# Patient Record
Sex: FEMALE | Race: WHITE | Employment: OTHER | URBAN - METROPOLITAN AREA
[De-identification: names, ages, dates, MRNs, and addresses within clinical notes are randomized per-mention and may not be internally consistent; named-entity substitution may affect disease eponyms.]

---

## 2017-06-03 ENCOUNTER — HOSPITAL ENCOUNTER (OUTPATIENT)
Dept: MAMMOGRAPHY | Age: 77
Discharge: HOME | End: 2017-06-03
Attending: OBSTETRICS & GYNECOLOGY
Payer: MEDICARE

## 2017-06-03 DIAGNOSIS — Z12.31 VISIT FOR SCREENING MAMMOGRAM: ICD-10-CM

## 2017-06-03 PROCEDURE — 77067 SCR MAMMO BI INCL CAD: CPT

## 2017-10-03 ENCOUNTER — HOSPITAL ENCOUNTER (OUTPATIENT)
Dept: GENERAL RADIOLOGY | Age: 77
Discharge: HOME | End: 2017-10-03
Attending: FAMILY MEDICINE
Payer: MEDICARE

## 2017-10-03 DIAGNOSIS — G89.29 CHRONIC LEFT SHOULDER PAIN: ICD-10-CM

## 2017-10-03 DIAGNOSIS — M25.512 CHRONIC LEFT SHOULDER PAIN: ICD-10-CM

## 2017-10-03 PROCEDURE — 73030 X-RAY EXAM OF SHOULDER: CPT

## 2017-10-03 NOTE — PROGRESS NOTES
Please let patient know the shoulder x-ray showed the bones to be normal and healthy.  Good report, we will recheck the shoulder in 2 weeks

## 2018-03-22 ENCOUNTER — APPOINTMENT (RX ONLY)
Dept: URBAN - METROPOLITAN AREA CLINIC 349 | Facility: CLINIC | Age: 78
Setting detail: DERMATOLOGY
End: 2018-03-22

## 2018-03-22 DIAGNOSIS — Z80.8 FAMILY HISTORY OF MALIGNANT NEOPLASM OF OTHER ORGANS OR SYSTEMS: ICD-10-CM

## 2018-03-22 DIAGNOSIS — D22 MELANOCYTIC NEVI: ICD-10-CM | Status: STABLE

## 2018-03-22 DIAGNOSIS — Z85.828 PERSONAL HISTORY OF OTHER MALIGNANT NEOPLASM OF SKIN: ICD-10-CM

## 2018-03-22 PROCEDURE — ? COUNSELING

## 2018-03-22 ASSESSMENT — LOCATION SIMPLE DESCRIPTION DERM
LOCATION SIMPLE: RIGHT UPPER BACK
LOCATION SIMPLE: LEFT UPPER BACK
LOCATION SIMPLE: RIGHT FOREARM
LOCATION SIMPLE: LEFT EAR

## 2018-03-22 ASSESSMENT — LOCATION DETAILED DESCRIPTION DERM
LOCATION DETAILED: RIGHT PROXIMAL DORSAL FOREARM
LOCATION DETAILED: RIGHT MEDIAL UPPER BACK
LOCATION DETAILED: LEFT ANTITRAGUS
LOCATION DETAILED: LEFT INFERIOR MEDIAL UPPER BACK

## 2018-03-22 ASSESSMENT — LOCATION ZONE DERM
LOCATION ZONE: TRUNK
LOCATION ZONE: EAR
LOCATION ZONE: ARM

## 2018-03-22 NOTE — PROCEDURE: REASSURANCE
Detail Level: Detailed
Quality 194: Oncology: Cancer Stage Documented: Cancer Stage not Documented, Reason not Otherwise Specified

## 2018-06-05 ENCOUNTER — HOSPITAL ENCOUNTER (OUTPATIENT)
Dept: MAMMOGRAPHY | Age: 78
Discharge: HOME | End: 2018-06-05
Attending: FAMILY MEDICINE
Payer: MEDICARE

## 2018-06-05 DIAGNOSIS — Z12.31 VISIT FOR SCREENING MAMMOGRAM: ICD-10-CM

## 2018-06-05 PROCEDURE — 77067 SCR MAMMO BI INCL CAD: CPT

## 2018-06-22 ENCOUNTER — APPOINTMENT (RX ONLY)
Dept: URBAN - METROPOLITAN AREA CLINIC 349 | Facility: CLINIC | Age: 78
Setting detail: DERMATOLOGY
End: 2018-06-22

## 2018-06-22 DIAGNOSIS — L82.1 OTHER SEBORRHEIC KERATOSIS: ICD-10-CM

## 2018-06-22 DIAGNOSIS — L73.8 OTHER SPECIFIED FOLLICULAR DISORDERS: ICD-10-CM

## 2018-06-22 PROCEDURE — ? BENIGN DESTRUCTION COSMETIC

## 2018-06-22 PROCEDURE — ? COUNSELING

## 2018-06-22 PROCEDURE — ? LIQUID NITROGEN (COSMETIC)

## 2018-06-22 PROCEDURE — ? OTHER

## 2018-06-22 ASSESSMENT — LOCATION ZONE DERM: LOCATION ZONE: FACE

## 2018-06-22 ASSESSMENT — LOCATION SIMPLE DESCRIPTION DERM
LOCATION SIMPLE: RIGHT CHEEK
LOCATION SIMPLE: LEFT CHEEK

## 2018-06-22 ASSESSMENT — LOCATION DETAILED DESCRIPTION DERM
LOCATION DETAILED: LEFT INFERIOR CENTRAL MALAR CHEEK
LOCATION DETAILED: RIGHT SUPERIOR NASAL CHEEK

## 2018-06-22 NOTE — PROCEDURE: BENIGN DESTRUCTION COSMETIC
Anesthesia Volume In Cc: 0
Detail Level: Detailed
Post-Care Instructions: I reviewed with the patient in detail post-care instructions. Patient is to wear sunprotection, and avoid picking at any of the treated lesions. Pt may apply Vaseline to crusted or scabbing areas.
Price (Use Numbers Only, No Special Characters Or $): 25.00
Consent: The patient's consent was obtained including but not limited to risks of crusting, scabbing, blistering, scarring, darker or lighter pigmentary change, recurrence, incomplete removal and infection.

## 2018-06-22 NOTE — PROCEDURE: OTHER
Detail Level: Detailed
Other (Free Text): Before cosmetic cryo treatment photo taken today
Note Text (......Xxx Chief Complaint.): This diagnosis correlates with the

## 2018-06-22 NOTE — PROCEDURE: LIQUID NITROGEN (COSMETIC)
Price (Use Numbers Only, No Special Characters Or $): 25.00
Render Post-Care Instructions In Note?: no
Detail Level: Detailed
Consent: The patient's consent was obtained including but not limited to risks of crusting, scabbing, blistering, scarring, darker or lighter pigmentary change, recurrence, incomplete removal and infection. The patient understands that the procedure is cosmetic in nature and is not covered by insurance.
Post-Care Instructions: I reviewed with the patient in detail post-care instructions. Patient is to wear sunprotection, and avoid picking at any of the treated lesions. Pt may apply Vaseline to crusted or scabbing areas.

## 2018-10-03 ENCOUNTER — HOSPITAL ENCOUNTER (OUTPATIENT)
Dept: GENERAL RADIOLOGY | Age: 78
Discharge: HOME | End: 2018-10-03
Attending: FAMILY MEDICINE
Payer: MEDICARE

## 2018-10-03 PROCEDURE — 72040 X-RAY EXAM NECK SPINE 2-3 VW: CPT

## 2018-10-03 PROCEDURE — 72072 X-RAY EXAM THORAC SPINE 3VWS: CPT

## 2018-11-18 PROBLEM — Z98.890 S/P COLONOSCOPY: Chronic | Status: ACTIVE | Noted: 2018-11-18

## 2018-12-17 ENCOUNTER — HOSPITAL ENCOUNTER (OUTPATIENT)
Dept: MAMMOGRAPHY | Age: 78
Discharge: HOME | End: 2018-12-17
Attending: FAMILY MEDICINE
Payer: MEDICARE

## 2018-12-17 DIAGNOSIS — Z78.0 POSTMENOPAUSAL: ICD-10-CM

## 2018-12-17 PROCEDURE — 77080 DXA BONE DENSITY AXIAL: CPT

## 2018-12-17 NOTE — PROGRESS NOTES
Please let patient know reviewed her bone density scan. She still has higher risk for hip fracture. However there is been no improvement in the bone density in her spine. We will discuss further on follow-up in February no changes in current therapy.

## 2019-08-07 ENCOUNTER — HOSPITAL ENCOUNTER (OUTPATIENT)
Dept: MAMMOGRAPHY | Age: 79
Discharge: HOME | End: 2019-08-07
Attending: FAMILY MEDICINE
Payer: MEDICARE

## 2019-08-07 DIAGNOSIS — Z12.39 BREAST SCREENING, UNSPECIFIED: ICD-10-CM

## 2019-08-07 PROCEDURE — 77067 SCR MAMMO BI INCL CAD: CPT

## 2019-10-18 PROBLEM — Z96.0 PRESENCE OF PESSARY: Status: ACTIVE | Noted: 2019-03-19

## 2019-10-18 PROBLEM — N81.4 UTERINE PROLAPSE: Status: ACTIVE | Noted: 2019-03-19

## 2019-10-21 ENCOUNTER — HOSPITAL ENCOUNTER (OUTPATIENT)
Dept: GENERAL RADIOLOGY | Age: 79
Discharge: HOME | End: 2019-10-21
Attending: INTERNAL MEDICINE
Payer: MEDICARE

## 2019-10-21 DIAGNOSIS — M54.42 LEFT-SIDED LOW BACK PAIN WITH LEFT-SIDED SCIATICA, UNSPECIFIED CHRONICITY: ICD-10-CM

## 2019-10-21 PROCEDURE — 72110 X-RAY EXAM L-2 SPINE 4/>VWS: CPT

## 2019-10-21 NOTE — PROGRESS NOTES
Lumbar xray negative for compression fracture; does have multilevel degenerative changes, disc space narrowing; is she ok with me ordering MRI? Does she have any metal in her body?

## 2019-10-21 NOTE — PROGRESS NOTES
Talked to pt and informed her, per Dr. Jose Angel, Lumbar xray negative for compression fracture; does have multilevel degenerative changes, disc space narrowing; is she ok with me ordering MRI? Does she have any metal in her body? Pt is ok w/ you ordering an MRI and she has no metal in her body.

## 2019-11-01 ENCOUNTER — HOSPITAL ENCOUNTER (OUTPATIENT)
Dept: MRI IMAGING | Age: 79
Discharge: HOME | End: 2019-11-01
Attending: INTERNAL MEDICINE
Payer: MEDICARE

## 2019-11-01 DIAGNOSIS — M54.42 LEFT-SIDED LOW BACK PAIN WITH LEFT-SIDED SCIATICA, UNSPECIFIED CHRONICITY: ICD-10-CM

## 2019-11-01 PROCEDURE — 72148 MRI LUMBAR SPINE W/O DYE: CPT

## 2019-11-05 NOTE — PROGRESS NOTES
Talked to pt and informed her, per Dr. Tipton Situ, MRI revealed multilevel degenerative disc disease, foraminal encroachment, would benefit from eval w/ Neurosurgery; is she ok with referral?  Pt stated she would like to \"pray about being referred to a neurosurgeon\". She will let us know once she's decided.

## 2019-11-21 PROBLEM — M48.07 FORAMINAL STENOSIS OF LUMBOSACRAL REGION: Status: ACTIVE | Noted: 2019-11-21

## 2020-10-26 ENCOUNTER — TRANSCRIBE ORDER (OUTPATIENT)
Dept: SCHEDULING | Age: 80
End: 2020-10-26

## 2020-10-26 DIAGNOSIS — Z12.31 ENCOUNTER FOR SCREENING MAMMOGRAM FOR MALIGNANT NEOPLASM OF BREAST: Primary | ICD-10-CM

## 2020-10-29 ENCOUNTER — HOSPITAL ENCOUNTER (OUTPATIENT)
Dept: MAMMOGRAPHY | Age: 80
Discharge: HOME | End: 2020-10-29
Attending: OBSTETRICS & GYNECOLOGY
Payer: MEDICARE

## 2020-10-29 DIAGNOSIS — Z12.31 ENCOUNTER FOR SCREENING MAMMOGRAM FOR MALIGNANT NEOPLASM OF BREAST: ICD-10-CM

## 2020-10-29 PROCEDURE — 77067 SCR MAMMO BI INCL CAD: CPT

## 2021-03-24 PROBLEM — M85.80 OSTEOPENIA: Status: ACTIVE | Noted: 2021-03-24

## 2021-03-24 PROBLEM — M51.36 DEGENERATIVE DISC DISEASE, LUMBAR: Status: ACTIVE | Noted: 2021-03-24

## 2021-03-24 PROBLEM — N18.31 STAGE 3A CHRONIC KIDNEY DISEASE (HCC): Status: ACTIVE | Noted: 2021-03-24

## 2021-03-24 PROBLEM — K21.9 GASTROESOPHAGEAL REFLUX DISEASE: Status: ACTIVE | Noted: 2021-03-24

## 2021-03-24 PROBLEM — E78.2 MIXED HYPERLIPIDEMIA: Status: ACTIVE | Noted: 2021-03-24

## 2021-04-20 PROBLEM — E55.9 VITAMIN D DEFICIENCY: Status: ACTIVE | Noted: 2021-04-20

## 2021-04-27 ENCOUNTER — HOME HEALTH ADMISSION (OUTPATIENT)
Dept: HOME HEALTH SERVICES | Facility: HOME HEALTH | Age: 81
End: 2021-04-27
Payer: MEDICARE

## 2021-04-27 PROBLEM — J30.9 ALLERGIC RHINITIS: Status: ACTIVE | Noted: 2021-04-27

## 2021-04-28 ENCOUNTER — HOME CARE VISIT (OUTPATIENT)
Dept: SCHEDULING | Facility: HOME HEALTH | Age: 81
End: 2021-04-28
Payer: MEDICARE

## 2021-04-28 VITALS
SYSTOLIC BLOOD PRESSURE: 130 MMHG | OXYGEN SATURATION: 95 % | HEART RATE: 52 BPM | DIASTOLIC BLOOD PRESSURE: 74 MMHG | TEMPERATURE: 98.6 F

## 2021-04-28 PROCEDURE — G0151 HHCP-SERV OF PT,EA 15 MIN: HCPCS

## 2021-04-28 PROCEDURE — 3331090002 HH PPS REVENUE DEBIT

## 2021-04-28 PROCEDURE — 400013 HH SOC

## 2021-04-28 PROCEDURE — 400018 HH-NO PAY CLAIM PROCEDURE

## 2021-04-28 PROCEDURE — 3331090001 HH PPS REVENUE CREDIT

## 2021-04-29 PROCEDURE — 3331090002 HH PPS REVENUE DEBIT

## 2021-04-29 PROCEDURE — 3331090001 HH PPS REVENUE CREDIT

## 2021-04-30 ENCOUNTER — HOME CARE VISIT (OUTPATIENT)
Dept: SCHEDULING | Facility: HOME HEALTH | Age: 81
End: 2021-04-30
Payer: MEDICARE

## 2021-04-30 VITALS
SYSTOLIC BLOOD PRESSURE: 130 MMHG | DIASTOLIC BLOOD PRESSURE: 70 MMHG | RESPIRATION RATE: 18 BRPM | TEMPERATURE: 98.2 F | HEART RATE: 60 BPM

## 2021-04-30 PROCEDURE — 3331090002 HH PPS REVENUE DEBIT

## 2021-04-30 PROCEDURE — G0157 HHC PT ASSISTANT EA 15: HCPCS

## 2021-04-30 PROCEDURE — 3331090001 HH PPS REVENUE CREDIT

## 2021-05-01 PROCEDURE — 3331090001 HH PPS REVENUE CREDIT

## 2021-05-01 PROCEDURE — 3331090002 HH PPS REVENUE DEBIT

## 2021-05-02 PROCEDURE — 3331090002 HH PPS REVENUE DEBIT

## 2021-05-02 PROCEDURE — 3331090001 HH PPS REVENUE CREDIT

## 2021-05-03 ENCOUNTER — HOME CARE VISIT (OUTPATIENT)
Dept: SCHEDULING | Facility: HOME HEALTH | Age: 81
End: 2021-05-03
Payer: MEDICARE

## 2021-05-03 VITALS
SYSTOLIC BLOOD PRESSURE: 126 MMHG | RESPIRATION RATE: 18 BRPM | HEART RATE: 54 BPM | TEMPERATURE: 95.4 F | DIASTOLIC BLOOD PRESSURE: 86 MMHG

## 2021-05-03 PROCEDURE — G0151 HHCP-SERV OF PT,EA 15 MIN: HCPCS

## 2021-05-03 PROCEDURE — 3331090002 HH PPS REVENUE DEBIT

## 2021-05-03 PROCEDURE — 3331090001 HH PPS REVENUE CREDIT

## 2021-05-04 PROCEDURE — 3331090002 HH PPS REVENUE DEBIT

## 2021-05-04 PROCEDURE — 3331090001 HH PPS REVENUE CREDIT

## 2021-05-05 PROCEDURE — 3331090002 HH PPS REVENUE DEBIT

## 2021-05-05 PROCEDURE — 3331090001 HH PPS REVENUE CREDIT

## 2021-05-06 ENCOUNTER — HOME CARE VISIT (OUTPATIENT)
Dept: SCHEDULING | Facility: HOME HEALTH | Age: 81
End: 2021-05-06
Payer: MEDICARE

## 2021-05-06 VITALS
OXYGEN SATURATION: 97 % | HEART RATE: 58 BPM | DIASTOLIC BLOOD PRESSURE: 74 MMHG | SYSTOLIC BLOOD PRESSURE: 138 MMHG | TEMPERATURE: 97.3 F

## 2021-05-06 PROCEDURE — G0157 HHC PT ASSISTANT EA 15: HCPCS

## 2021-05-06 PROCEDURE — 3331090002 HH PPS REVENUE DEBIT

## 2021-05-06 PROCEDURE — 3331090001 HH PPS REVENUE CREDIT

## 2021-05-07 PROCEDURE — 3331090002 HH PPS REVENUE DEBIT

## 2021-05-07 PROCEDURE — 3331090001 HH PPS REVENUE CREDIT

## 2021-05-08 PROCEDURE — 3331090001 HH PPS REVENUE CREDIT

## 2021-05-08 PROCEDURE — 3331090002 HH PPS REVENUE DEBIT

## 2021-05-09 PROCEDURE — 3331090002 HH PPS REVENUE DEBIT

## 2021-05-09 PROCEDURE — 3331090001 HH PPS REVENUE CREDIT

## 2021-05-10 ENCOUNTER — HOME CARE VISIT (OUTPATIENT)
Dept: SCHEDULING | Facility: HOME HEALTH | Age: 81
End: 2021-05-10
Payer: MEDICARE

## 2021-05-10 VITALS
TEMPERATURE: 97.4 F | RESPIRATION RATE: 14 BRPM | SYSTOLIC BLOOD PRESSURE: 122 MMHG | DIASTOLIC BLOOD PRESSURE: 76 MMHG | HEART RATE: 58 BPM | OXYGEN SATURATION: 95 %

## 2021-05-10 PROCEDURE — 3331090002 HH PPS REVENUE DEBIT

## 2021-05-10 PROCEDURE — 3331090001 HH PPS REVENUE CREDIT

## 2021-05-10 PROCEDURE — G0157 HHC PT ASSISTANT EA 15: HCPCS

## 2021-05-11 PROCEDURE — 3331090001 HH PPS REVENUE CREDIT

## 2021-05-11 PROCEDURE — 3331090002 HH PPS REVENUE DEBIT

## 2021-05-12 PROCEDURE — 3331090001 HH PPS REVENUE CREDIT

## 2021-05-12 PROCEDURE — 3331090002 HH PPS REVENUE DEBIT

## 2021-05-13 ENCOUNTER — HOME CARE VISIT (OUTPATIENT)
Dept: SCHEDULING | Facility: HOME HEALTH | Age: 81
End: 2021-05-13
Payer: MEDICARE

## 2021-05-13 VITALS
DIASTOLIC BLOOD PRESSURE: 82 MMHG | SYSTOLIC BLOOD PRESSURE: 142 MMHG | TEMPERATURE: 97.3 F | OXYGEN SATURATION: 98 % | HEART RATE: 61 BPM | RESPIRATION RATE: 16 BRPM

## 2021-05-13 PROCEDURE — G0157 HHC PT ASSISTANT EA 15: HCPCS

## 2021-05-13 PROCEDURE — 3331090001 HH PPS REVENUE CREDIT

## 2021-05-13 PROCEDURE — 3331090002 HH PPS REVENUE DEBIT

## 2021-05-14 PROCEDURE — 3331090002 HH PPS REVENUE DEBIT

## 2021-05-14 PROCEDURE — 3331090001 HH PPS REVENUE CREDIT

## 2021-05-15 PROCEDURE — 3331090001 HH PPS REVENUE CREDIT

## 2021-05-15 PROCEDURE — 3331090002 HH PPS REVENUE DEBIT

## 2021-05-16 PROCEDURE — 3331090002 HH PPS REVENUE DEBIT

## 2021-05-16 PROCEDURE — 3331090001 HH PPS REVENUE CREDIT

## 2021-05-17 ENCOUNTER — HOME CARE VISIT (OUTPATIENT)
Dept: SCHEDULING | Facility: HOME HEALTH | Age: 81
End: 2021-05-17
Payer: MEDICARE

## 2021-05-17 VITALS
DIASTOLIC BLOOD PRESSURE: 78 MMHG | TEMPERATURE: 97.7 F | HEART RATE: 60 BPM | RESPIRATION RATE: 16 BRPM | SYSTOLIC BLOOD PRESSURE: 126 MMHG

## 2021-05-17 PROCEDURE — 3331090002 HH PPS REVENUE DEBIT

## 2021-05-17 PROCEDURE — G0151 HHCP-SERV OF PT,EA 15 MIN: HCPCS

## 2021-05-17 PROCEDURE — 3331090001 HH PPS REVENUE CREDIT

## 2021-12-05 PROBLEM — E78.2 MIXED HYPERLIPIDEMIA: Status: RESOLVED | Noted: 2021-03-24 | Resolved: 2021-12-05

## 2021-12-22 ENCOUNTER — APPOINTMENT (RX ONLY)
Dept: URBAN - METROPOLITAN AREA CLINIC 349 | Facility: CLINIC | Age: 81
Setting detail: DERMATOLOGY
End: 2021-12-22

## 2021-12-22 ENCOUNTER — APPOINTMENT (OUTPATIENT)
Dept: PHYSICAL THERAPY | Age: 81
End: 2021-12-22
Attending: STUDENT IN AN ORGANIZED HEALTH CARE EDUCATION/TRAINING PROGRAM

## 2021-12-22 DIAGNOSIS — L82.1 OTHER SEBORRHEIC KERATOSIS: ICD-10-CM

## 2021-12-22 DIAGNOSIS — Z85.828 PERSONAL HISTORY OF OTHER MALIGNANT NEOPLASM OF SKIN: ICD-10-CM

## 2021-12-22 DIAGNOSIS — Z80.8 FAMILY HISTORY OF MALIGNANT NEOPLASM OF OTHER ORGANS OR SYSTEMS: ICD-10-CM

## 2021-12-22 DIAGNOSIS — L82.0 INFLAMED SEBORRHEIC KERATOSIS: ICD-10-CM

## 2021-12-22 DIAGNOSIS — L21.8 OTHER SEBORRHEIC DERMATITIS: ICD-10-CM | Status: WELL CONTROLLED

## 2021-12-22 DIAGNOSIS — L85.3 XEROSIS CUTIS: ICD-10-CM

## 2021-12-22 PROBLEM — I10 ESSENTIAL (PRIMARY) HYPERTENSION: Status: ACTIVE | Noted: 2021-12-22

## 2021-12-22 PROBLEM — E03.9 HYPOTHYROIDISM, UNSPECIFIED: Status: ACTIVE | Noted: 2021-12-22

## 2021-12-22 PROCEDURE — ? TREATMENT REGIMEN

## 2021-12-22 PROCEDURE — 99213 OFFICE O/P EST LOW 20 MIN: CPT | Mod: 25

## 2021-12-22 PROCEDURE — 17110 DESTRUCTION B9 LES UP TO 14: CPT

## 2021-12-22 PROCEDURE — ? LIQUID NITROGEN

## 2021-12-22 PROCEDURE — ? PRESCRIPTION MEDICATION MANAGEMENT

## 2021-12-22 PROCEDURE — ? COUNSELING

## 2021-12-22 ASSESSMENT — LOCATION DETAILED DESCRIPTION DERM
LOCATION DETAILED: LEFT ANTITRAGUS
LOCATION DETAILED: RIGHT PROXIMAL DORSAL FOREARM
LOCATION DETAILED: LEFT PROXIMAL RADIAL DORSAL FOREARM
LOCATION DETAILED: RIGHT PROXIMAL RADIAL DORSAL FOREARM
LOCATION DETAILED: RIGHT SUPERIOR LATERAL MALAR CHEEK
LOCATION DETAILED: RIGHT INFERIOR CENTRAL MALAR CHEEK
LOCATION DETAILED: LEFT DISTAL DORSAL FOREARM
LOCATION DETAILED: RIGHT DISTAL DORSAL FOREARM
LOCATION DETAILED: LEFT INFERIOR MEDIAL UPPER BACK
LOCATION DETAILED: MID-FRONTAL SCALP

## 2021-12-22 ASSESSMENT — LOCATION SIMPLE DESCRIPTION DERM
LOCATION SIMPLE: RIGHT CHEEK
LOCATION SIMPLE: LEFT UPPER BACK
LOCATION SIMPLE: LEFT FOREARM
LOCATION SIMPLE: RIGHT FOREARM
LOCATION SIMPLE: LEFT EAR
LOCATION SIMPLE: ANTERIOR SCALP

## 2021-12-22 ASSESSMENT — LOCATION ZONE DERM
LOCATION ZONE: ARM
LOCATION ZONE: EAR
LOCATION ZONE: SCALP
LOCATION ZONE: FACE
LOCATION ZONE: TRUNK

## 2021-12-22 ASSESSMENT — SEVERITY ASSESSMENT: HOW SEVERE IS THIS PATIENT'S CONDITION?: CLEAR

## 2021-12-22 NOTE — PROCEDURE: TREATMENT REGIMEN
Samples Given: Cerave cream, Cerave hydrating wash, eucerin advanced repair cream, apply to arms as needed.
Detail Level: Detailed

## 2021-12-22 NOTE — PROCEDURE: PRESCRIPTION MEDICATION MANAGEMENT
Plan: Use clobetasol 0.05 % scalp solution twice daily x 2 weeks as needed for flares. \\nPatient declines needing refills at this time.
Detail Level: Zone
Render In Strict Bullet Format?: No

## 2021-12-22 NOTE — PROCEDURE: LIQUID NITROGEN
Medical Necessity Clause: This procedure was medically necessary because the lesions that were treated were:
Duration Of Freeze Thaw-Cycle (Seconds): 5-10
Add 52 Modifier (Optional): no
Post-Care Instructions: I reviewed with the patient in detail post-care instructions. Patient is to wear sunprotection, and avoid picking at any of the treated lesions. Pt may apply Vaseline to crusted or scabbing areas.
Detail Level: Detailed
Show Topical Anesthesia Variable?: Yes
Number Of Freeze-Thaw Cycles: 2 freeze-thaw cycles
Medical Necessity Information: It is in your best interest to select a reason for this procedure from the list below. All of these items fulfill various CMS LCD requirements except the new and changing color options.
Consent: The patient's consent was obtained including but not limited to risks of crusting, scabbing, blistering, scarring, darker or lighter pigmentary change, recurrence, incomplete removal and infection.

## 2021-12-29 ENCOUNTER — HOME HEALTH ADMISSION (OUTPATIENT)
Dept: HOME HEALTH SERVICES | Facility: HOME HEALTH | Age: 81
End: 2021-12-29
Payer: MEDICARE

## 2022-01-06 ENCOUNTER — HOME CARE VISIT (OUTPATIENT)
Dept: HOME HEALTH SERVICES | Facility: HOME HEALTH | Age: 82
End: 2022-01-06

## 2022-01-11 ENCOUNTER — HOME CARE VISIT (OUTPATIENT)
Dept: SCHEDULING | Facility: HOME HEALTH | Age: 82
End: 2022-01-11
Payer: MEDICARE

## 2022-01-11 VITALS
TEMPERATURE: 98.1 F | HEART RATE: 80 BPM | OXYGEN SATURATION: 96 % | RESPIRATION RATE: 17 BRPM | SYSTOLIC BLOOD PRESSURE: 128 MMHG | DIASTOLIC BLOOD PRESSURE: 78 MMHG

## 2022-01-11 PROCEDURE — 400013 HH SOC

## 2022-01-11 PROCEDURE — G0151 HHCP-SERV OF PT,EA 15 MIN: HCPCS

## 2022-01-11 PROCEDURE — 400018 HH-NO PAY CLAIM PROCEDURE

## 2022-01-13 ENCOUNTER — HOME CARE VISIT (OUTPATIENT)
Dept: HOME HEALTH SERVICES | Facility: HOME HEALTH | Age: 82
End: 2022-01-13
Payer: MEDICARE

## 2022-01-17 ENCOUNTER — HOME CARE VISIT (OUTPATIENT)
Dept: HOME HEALTH SERVICES | Facility: HOME HEALTH | Age: 82
End: 2022-01-17
Payer: MEDICARE

## 2022-01-17 ENCOUNTER — HOME CARE VISIT (OUTPATIENT)
Dept: SCHEDULING | Facility: HOME HEALTH | Age: 82
End: 2022-01-17
Payer: MEDICARE

## 2022-01-17 VITALS
RESPIRATION RATE: 16 BRPM | TEMPERATURE: 98 F | OXYGEN SATURATION: 96 % | DIASTOLIC BLOOD PRESSURE: 84 MMHG | HEART RATE: 80 BPM | SYSTOLIC BLOOD PRESSURE: 136 MMHG

## 2022-01-17 PROCEDURE — G0151 HHCP-SERV OF PT,EA 15 MIN: HCPCS

## 2022-01-20 ENCOUNTER — HOME CARE VISIT (OUTPATIENT)
Dept: SCHEDULING | Facility: HOME HEALTH | Age: 82
End: 2022-01-20
Payer: MEDICARE

## 2022-01-20 VITALS
DIASTOLIC BLOOD PRESSURE: 76 MMHG | HEART RATE: 72 BPM | SYSTOLIC BLOOD PRESSURE: 134 MMHG | OXYGEN SATURATION: 95 % | TEMPERATURE: 97.8 F | RESPIRATION RATE: 16 BRPM

## 2022-01-20 PROCEDURE — G0157 HHC PT ASSISTANT EA 15: HCPCS

## 2022-01-25 ENCOUNTER — HOME CARE VISIT (OUTPATIENT)
Dept: SCHEDULING | Facility: HOME HEALTH | Age: 82
End: 2022-01-25
Payer: MEDICARE

## 2022-01-25 VITALS
TEMPERATURE: 97.9 F | RESPIRATION RATE: 16 BRPM | HEART RATE: 76 BPM | SYSTOLIC BLOOD PRESSURE: 110 MMHG | DIASTOLIC BLOOD PRESSURE: 66 MMHG | OXYGEN SATURATION: 93 %

## 2022-01-25 PROCEDURE — G0157 HHC PT ASSISTANT EA 15: HCPCS

## 2022-01-27 ENCOUNTER — HOME CARE VISIT (OUTPATIENT)
Dept: SCHEDULING | Facility: HOME HEALTH | Age: 82
End: 2022-01-27
Payer: MEDICARE

## 2022-01-27 VITALS
TEMPERATURE: 97.7 F | OXYGEN SATURATION: 97 % | HEART RATE: 76 BPM | RESPIRATION RATE: 18 BRPM | DIASTOLIC BLOOD PRESSURE: 72 MMHG | SYSTOLIC BLOOD PRESSURE: 112 MMHG

## 2022-01-27 PROCEDURE — G0151 HHCP-SERV OF PT,EA 15 MIN: HCPCS

## 2022-03-18 PROBLEM — M85.80 OSTEOPENIA: Status: ACTIVE | Noted: 2021-03-24

## 2022-03-18 PROBLEM — M51.36 DEGENERATIVE DISC DISEASE, LUMBAR: Status: ACTIVE | Noted: 2021-03-24

## 2022-03-19 PROBLEM — K21.9 GASTROESOPHAGEAL REFLUX DISEASE: Status: ACTIVE | Noted: 2021-03-24

## 2022-03-19 PROBLEM — Z96.0 PRESENCE OF PESSARY: Status: ACTIVE | Noted: 2019-03-19

## 2022-03-19 PROBLEM — M48.07 FORAMINAL STENOSIS OF LUMBOSACRAL REGION: Status: ACTIVE | Noted: 2019-11-21

## 2022-03-19 PROBLEM — E55.9 VITAMIN D DEFICIENCY: Status: ACTIVE | Noted: 2021-04-20

## 2022-03-20 PROBLEM — Z98.890 S/P COLONOSCOPY: Status: ACTIVE | Noted: 2018-11-18

## 2022-03-20 PROBLEM — N81.4 UTERINE PROLAPSE: Status: ACTIVE | Noted: 2019-03-19

## 2022-03-20 PROBLEM — J30.9 ALLERGIC RHINITIS: Status: ACTIVE | Noted: 2021-04-27

## 2022-05-31 ENCOUNTER — TELEPHONE (OUTPATIENT)
Dept: INTERNAL MEDICINE CLINIC | Facility: CLINIC | Age: 82
End: 2022-05-31

## 2022-05-31 DIAGNOSIS — F32.A ANXIETY AND DEPRESSION: Primary | ICD-10-CM

## 2022-05-31 DIAGNOSIS — F41.9 ANXIETY AND DEPRESSION: Primary | ICD-10-CM

## 2022-05-31 RX ORDER — DIAZEPAM 5 MG/1
TABLET ORAL
Qty: 60 TABLET | Refills: 2 | Status: SHIPPED | OUTPATIENT
Start: 2022-05-31 | End: 2022-09-02 | Stop reason: SDUPTHER

## 2022-05-31 NOTE — TELEPHONE ENCOUNTER
Patient requesting refill on Valium. PDMP reviewed showing diazepam 5 mg tablets, quantity #60, 30-day supply last filled on 4/28/2022. New prescription with refills sent to pharmacy on record.

## 2022-05-31 NOTE — TELEPHONE ENCOUNTER
Pt called and stated that she is completely out of Valium and has been all weekend. She is requesting refill today if possible.

## 2022-07-14 DIAGNOSIS — E03.9 ACQUIRED HYPOTHYROIDISM: Primary | ICD-10-CM

## 2022-07-14 DIAGNOSIS — E03.9 ACQUIRED HYPOTHYROIDISM: ICD-10-CM

## 2022-07-14 RX ORDER — LEVOTHYROXINE SODIUM 0.1 MG/1
TABLET ORAL
Qty: 90 TABLET | OUTPATIENT
Start: 2022-07-14

## 2022-07-14 RX ORDER — LEVOTHYROXINE SODIUM 0.1 MG/1
100 TABLET ORAL
Qty: 30 TABLET | Refills: 0 | Status: SHIPPED | OUTPATIENT
Start: 2022-07-14 | End: 2022-08-22 | Stop reason: SDUPTHER

## 2022-07-27 ENCOUNTER — NURSE ONLY (OUTPATIENT)
Dept: INTERNAL MEDICINE CLINIC | Facility: CLINIC | Age: 82
End: 2022-07-27

## 2022-07-27 ENCOUNTER — OFFICE VISIT (OUTPATIENT)
Dept: INTERNAL MEDICINE CLINIC | Facility: CLINIC | Age: 82
End: 2022-07-27
Payer: MEDICARE

## 2022-07-27 VITALS
TEMPERATURE: 97.4 F | SYSTOLIC BLOOD PRESSURE: 122 MMHG | BODY MASS INDEX: 26.16 KG/M2 | RESPIRATION RATE: 16 BRPM | HEIGHT: 66 IN | DIASTOLIC BLOOD PRESSURE: 79 MMHG | WEIGHT: 162.8 LBS | HEART RATE: 76 BPM | OXYGEN SATURATION: 95 %

## 2022-07-27 DIAGNOSIS — E03.9 ACQUIRED HYPOTHYROIDISM: ICD-10-CM

## 2022-07-27 DIAGNOSIS — R42 DIZZINESS: ICD-10-CM

## 2022-07-27 DIAGNOSIS — R79.9 ABNORMAL BLOOD CHEMISTRY: ICD-10-CM

## 2022-07-27 DIAGNOSIS — E55.9 VITAMIN D INSUFFICIENCY: ICD-10-CM

## 2022-07-27 DIAGNOSIS — K21.9 GASTROESOPHAGEAL REFLUX DISEASE WITHOUT ESOPHAGITIS: ICD-10-CM

## 2022-07-27 DIAGNOSIS — J30.1 NON-SEASONAL ALLERGIC RHINITIS DUE TO POLLEN: ICD-10-CM

## 2022-07-27 DIAGNOSIS — E55.9 VITAMIN D DEFICIENCY: ICD-10-CM

## 2022-07-27 DIAGNOSIS — Z12.31 VISIT FOR SCREENING MAMMOGRAM: ICD-10-CM

## 2022-07-27 DIAGNOSIS — R53.82 CHRONIC FATIGUE: ICD-10-CM

## 2022-07-27 DIAGNOSIS — M85.89 OSTEOPENIA OF MULTIPLE SITES: ICD-10-CM

## 2022-07-27 DIAGNOSIS — R63.4 WEIGHT LOSS: Primary | ICD-10-CM

## 2022-07-27 DIAGNOSIS — F41.9 ANXIETY AND DEPRESSION: ICD-10-CM

## 2022-07-27 DIAGNOSIS — F32.A ANXIETY AND DEPRESSION: ICD-10-CM

## 2022-07-27 DIAGNOSIS — R63.4 WEIGHT LOSS: ICD-10-CM

## 2022-07-27 PROCEDURE — 1123F ACP DISCUSS/DSCN MKR DOCD: CPT | Performed by: INTERNAL MEDICINE

## 2022-07-27 PROCEDURE — G8417 CALC BMI ABV UP PARAM F/U: HCPCS | Performed by: INTERNAL MEDICINE

## 2022-07-27 PROCEDURE — 1036F TOBACCO NON-USER: CPT | Performed by: INTERNAL MEDICINE

## 2022-07-27 PROCEDURE — G8399 PT W/DXA RESULTS DOCUMENT: HCPCS | Performed by: INTERNAL MEDICINE

## 2022-07-27 PROCEDURE — G8427 DOCREV CUR MEDS BY ELIG CLIN: HCPCS | Performed by: INTERNAL MEDICINE

## 2022-07-27 PROCEDURE — 1090F PRES/ABSN URINE INCON ASSESS: CPT | Performed by: INTERNAL MEDICINE

## 2022-07-27 PROCEDURE — 99214 OFFICE O/P EST MOD 30 MIN: CPT | Performed by: INTERNAL MEDICINE

## 2022-07-27 RX ORDER — OMEPRAZOLE 20 MG/1
20 CAPSULE, DELAYED RELEASE ORAL
Qty: 30 CAPSULE | Refills: 5 | Status: SHIPPED | OUTPATIENT
Start: 2022-07-27 | End: 2022-10-06

## 2022-07-27 RX ORDER — METOPROLOL SUCCINATE 25 MG/1
25 TABLET, EXTENDED RELEASE ORAL DAILY
COMMUNITY

## 2022-07-27 RX ORDER — MIRTAZAPINE 7.5 MG/1
7.5 TABLET, FILM COATED ORAL NIGHTLY
Qty: 30 TABLET | Refills: 5 | Status: SHIPPED | OUTPATIENT
Start: 2022-07-27 | End: 2022-10-06

## 2022-07-27 ASSESSMENT — PATIENT HEALTH QUESTIONNAIRE - PHQ9
SUM OF ALL RESPONSES TO PHQ QUESTIONS 1-9: 2
SUM OF ALL RESPONSES TO PHQ QUESTIONS 1-9: 2
SUM OF ALL RESPONSES TO PHQ9 QUESTIONS 1 & 2: 2
SUM OF ALL RESPONSES TO PHQ QUESTIONS 1-9: 2
1. LITTLE INTEREST OR PLEASURE IN DOING THINGS: 1
SUM OF ALL RESPONSES TO PHQ QUESTIONS 1-9: 2
2. FEELING DOWN, DEPRESSED OR HOPELESS: 1

## 2022-07-27 ASSESSMENT — ENCOUNTER SYMPTOMS
SINUS PRESSURE: 0
BACK PAIN: 1
COUGH: 0
CHEST TIGHTNESS: 0
RHINORRHEA: 1
NAUSEA: 0
SHORTNESS OF BREATH: 0
BLOOD IN STOOL: 0
DIARRHEA: 0
VOMITING: 0
ABDOMINAL PAIN: 0

## 2022-07-27 NOTE — PROGRESS NOTES
[facility]      [facility]    Patient Name: Majel Hodgkins  :  1940    Subjective:    Chief Complaint:  Chief Complaint   Patient presents with    Other     No appetite,tired,feels like she has a cold all the time,dizzy         HPI \"I'm falling apart\"; c/o feeling dizzy, has decreased appetite, fatigue x1 month; she has chronic back pain, she is scheduled to have KARI next week; she is having difficulty walking; She usually sees Dr. Tressa Godoy in our office, is accompanied by her , who related some of the history; she had labs done at Dr. Matt Prescott' office, and these were reviewed with her in detail; She denies fever, chills; she c/o heartburn daily; she takes Tums prn; she feels depressed and wants to sleep \"all the time\"; she is no longer taking Prozac; she denies N/V/D; she does not drink enough water during the day; she has lost 6-8 lbs in the past year; denies night sweats; denies blood in stool or black tarry stools; She has allergies,  states she has not been taking allegra; She has a knot on the R side of her head, has been there for a couple of months, and she has pain when she sleeps;      Past Medical History:   Diagnosis Date    CAD (coronary artery disease)     Cancer ([facility] [address]     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    Degenerative disc disease, lumbar 3/24/2021    Dizziness     GERD (gastroesophageal reflux disease)     Hypercholesterolemia     Hypertension     Menopause     Psychiatric disorder     anxiety    Thyroid disease        Past Surgical History:   Procedure Laterality Date    BREAST BIOPSY Left     benign tumor     CARDIAC CATHETERIZATION      CHOLECYSTECTOMY      COLONOSCOPY  2017    CORONARY ARTERY BYPASS GRAFT  2008    CABG x 2     CORONARY ARTERY BYPASS GRAFT      LEFT HEART CATH,PERCUTANEOUS      THYROIDECTOMY, PARTIAL      for benign mass    WISDOM TOOTH EXTRACTION         Family History   Problem Relation Age of Onset    Hypertension Daughter     Cancer Brother 27        brain     Other Daughter         fibromyalgia     Heart Disease Daughter         irregular heart beat     Heart Disease Father 78        MI    Breast Cancer Paternal Aunt 79    Breast Cancer Maternal Aunt     Alzheimer's Disease Mother     Lupus Daughter        Social History     Tobacco Use    Smoking status: Former     Packs/day: 1.00     Types: Cigarettes    Smokeless tobacco: Never   Substance Use Topics    Alcohol use: No                 Current Outpatient Medications:     metoprolol succinate (TOPROL XL) 25 MG extended release tablet, Take 25 mg by mouth in the morning. Half tab qd. , Disp: , Rfl:     omeprazole (PRILOSEC) 20 MG delayed release capsule, Take 1 capsule by mouth every morning (before breakfast), Disp: 30 capsule, Rfl: 5    mirtazapine (REMERON) 7.5 MG tablet, Take 1 tablet by mouth nightly, Disp: 30 tablet, Rfl: 5    levothyroxine (SYNTHROID) 100 MCG tablet, Take 1 tablet by mouth every morning (before breakfast), Disp: 30 tablet, Rfl: 0    diazePAM (VALIUM) 5 MG tablet, TAKE 1/2 TO 1 TABLET BY MOUTH TWICE DAILY AS NEEDED FOR ANXIETY, Disp: 60 tablet, Rfl: 2    acetaminophen (TYLENOL) 500 MG tablet, Take 500 mg by mouth every 6 hours as needed, Disp: , Rfl:     aspirin 81 MG EC tablet, Take 81 mg by mouth daily, Disp: , Rfl:     fluticasone (FLONASE) 50 MCG/ACT nasal spray, 2 sprays by Nasal route daily, Disp: , Rfl:     rosuvastatin (CRESTOR) 5 MG tablet, Take 5 mg by mouth daily, Disp: , Rfl:     gabapentin (NEURONTIN) 100 MG capsule, Take 100 mg by mouth 3 times daily.  (Patient not taking: Reported on 7/27/2022), Disp: , Rfl:     pantoprazole (PROTONIX) 40 MG tablet, Take 40 mg by mouth daily (Patient not taking: Reported on 7/27/2022), Disp: , Rfl:     Allergies   Allergen Reactions    Atorvastatin     Azithromycin Other (See Comments) and Hives    Ciprofloxacin Other (See Comments)    Erythromycin     Erythromycin Base Other (See Comments)    Iodides Other (See Comments) Rosuvastatin Other (See Comments)     Pt has to take BRAND only  Pt has to take BRAND only    Simvastatin     Statins Myalgia    Tetanus Toxoid, Adsorbed     Aspartame Rash    Penicillins Rash       Review of Systems   Constitutional:  Positive for appetite change, fatigue and unexpected weight change. Negative for chills and fever. HENT:  Positive for rhinorrhea. Negative for congestion, postnasal drip and sinus pressure. Eyes:  Negative for visual disturbance. Respiratory:  Negative for cough, chest tightness and shortness of breath. Cardiovascular:  Negative for chest pain and palpitations. Gastrointestinal:  Negative for abdominal pain, blood in stool, diarrhea, nausea and vomiting. Genitourinary:  Negative for dysuria, frequency and urgency. Musculoskeletal:  Positive for back pain and myalgias. Negative for arthralgias. Skin: Negative. Neurological:  Negative for numbness and headaches. Psychiatric/Behavioral:  Positive for dysphoric mood. Negative for sleep disturbance. The patient is not nervous/anxious. Objective:  /79 (Site: Left Upper Arm, Position: Sitting, Cuff Size: Medium Adult)   Pulse 76   Temp 97.4 °F (36.3 °C) (Temporal)   Resp 16   Ht 5' 5.5\" (1.664 m)   Wt 162 lb 12.8 oz (73.8 kg)   SpO2 95%   BMI 26.68 kg/m²     Examination:  Physical Exam  Constitutional:       Appearance: Normal appearance. HENT:      Head: Normocephalic and atraumatic. Right Ear: Tympanic membrane and ear canal normal.      Left Ear: Tympanic membrane and ear canal normal.      Nose: Nose normal.      Mouth/Throat:      Mouth: Mucous membranes are moist.   Eyes:      Extraocular Movements: Extraocular movements intact. Conjunctiva/sclera: Conjunctivae normal.      Pupils: Pupils are equal, round, and reactive to light. Cardiovascular:      Rate and Rhythm: Normal rate and regular rhythm. Pulses: Normal pulses. Heart sounds: Normal heart sounds.    Pulmonary: Effort: Pulmonary effort is normal.      Breath sounds: Normal breath sounds. Abdominal:      General: Abdomen is flat. Bowel sounds are normal.      Palpations: Abdomen is soft. Musculoskeletal:      Cervical back: Normal range of motion and neck supple. Skin:     General: Skin is warm and dry. Neurological:      General: No focal deficit present. Mental Status: She is alert. Mental status is at baseline. Psychiatric:         Mood and Affect: Mood normal.         Thought Content: Thought content normal.         Assessment/Plan:    Jeannine Randhawa was seen today for other. Diagnoses and all orders for this visit:    Weight loss  -     Comprehensive Metabolic Panel; Future  -     [facility]    Dizziness  -     Comprehensive Metabolic Panel; Future  -     CBC with Auto Differential; Future  -     Urinalysis; Future    Chronic fatigue  -     Vitamin D 25 Hydroxy; Future  -     Urinalysis; Future    Anxiety and depression  Instructed to take medications as prescribed, and to call if no improvement in symptoms. She is to f/u in 1 month for recheck;     -     mirtazapine (REMERON) 7.5 MG tablet; Take 1 tablet by mouth nightly    Vitamin D deficiency  Will check level today; Acquired hypothyroidism  -     T4, Free; Future  -     TSH; Future    Gastroesophageal reflux disease without esophagitis  Instructed to take medications as prescribed, and to call if no improvement in symptoms. Will send to GI for evaluation;     -     CBC with Auto Differential; Future  -     [facility]  -     omeprazole (PRILOSEC) 20 MG delayed release capsule; Take 1 capsule by mouth every morning (before breakfast)    Abnormal blood chemistry  -     Hemoglobin A1C; Future    Vitamin D insufficiency  -     Vitamin D 25 Hydroxy; Future    Visit for screening mammogram  -     MABLE DIGITAL SCREEN W OR WO CAD BILATERAL;  Future    Osteopenia of multiple sites  -     DEXA BONE DENSITY AXIAL SKELETON; Future    Non-seasonal allergic rhinitis due to pollen  Recommended trial of Flonase otc; Follow-up and Dispositions    Return in about 4 weeks (around 8/24/2022), or if symptoms worsen or fail to improve, for medicare annual w/ labs today. Medication Reconciliation:  Current Medications Verified: Current medications/ immunizations were reviewed, including purpose, with patient. Family History, Social History, Current and Past Medical History was reviewed with patient and updated at today's office visit. Medication Reconciliation list was given to patient/ family. Patient was advised to discard old medication lists and provide all providers with current list at each visit and carry list with them in case of emergency.     Completed By:   Elvia Larose MD    [facility]  [address], [address]  [phone]  [phone] fax  10:44 AM

## 2022-07-28 LAB
25(OH)D3 SERPL-MCNC: 21.4 NG/ML (ref 30–100)
ALBUMIN SERPL-MCNC: 3.7 G/DL (ref 3.2–4.6)
ALBUMIN/GLOB SERPL: 0.9 {RATIO} (ref 1.2–3.5)
ALP SERPL-CCNC: 60 U/L (ref 50–136)
ALT SERPL-CCNC: 17 U/L (ref 12–65)
ANION GAP SERPL CALC-SCNC: 9 MMOL/L (ref 7–16)
AST SERPL-CCNC: 15 U/L (ref 15–37)
BASOPHILS # BLD: 0.1 K/UL (ref 0–0.2)
BASOPHILS NFR BLD: 1 % (ref 0–2)
BILIRUB SERPL-MCNC: 0.6 MG/DL (ref 0.2–1.1)
BUN SERPL-MCNC: 7 MG/DL (ref 8–23)
CALCIUM SERPL-MCNC: 9.3 MG/DL (ref 8.3–10.4)
CHLORIDE SERPL-SCNC: 108 MMOL/L (ref 98–107)
CO2 SERPL-SCNC: 25 MMOL/L (ref 21–32)
CREAT SERPL-MCNC: 0.9 MG/DL (ref 0.6–1)
DIFFERENTIAL METHOD BLD: ABNORMAL
EOSINOPHIL # BLD: 0.1 K/UL (ref 0–0.8)
EOSINOPHIL NFR BLD: 2 % (ref 0.5–7.8)
ERYTHROCYTE [DISTWIDTH] IN BLOOD BY AUTOMATED COUNT: 13.3 % (ref 11.9–14.6)
EST. AVERAGE GLUCOSE BLD GHB EST-MCNC: 111 MG/DL
GLOBULIN SER CALC-MCNC: 3.9 G/DL (ref 2.3–3.5)
GLUCOSE SERPL-MCNC: 86 MG/DL (ref 65–100)
HBA1C MFR BLD: 5.5 % (ref 4.8–5.6)
HCT VFR BLD AUTO: 44.5 % (ref 35.8–46.3)
HGB BLD-MCNC: 14.1 G/DL (ref 11.7–15.4)
IMM GRANULOCYTES # BLD AUTO: 0 K/UL (ref 0–0.5)
IMM GRANULOCYTES NFR BLD AUTO: 1 % (ref 0–5)
LYMPHOCYTES # BLD: 2.4 K/UL (ref 0.5–4.6)
LYMPHOCYTES NFR BLD: 35 % (ref 13–44)
MCH RBC QN AUTO: 32.1 PG (ref 26.1–32.9)
MCHC RBC AUTO-ENTMCNC: 31.7 G/DL (ref 31.4–35)
MCV RBC AUTO: 101.4 FL (ref 79.6–97.8)
MONOCYTES # BLD: 0.7 K/UL (ref 0.1–1.3)
MONOCYTES NFR BLD: 11 % (ref 4–12)
NEUTS SEG # BLD: 3.4 K/UL (ref 1.7–8.2)
NEUTS SEG NFR BLD: 50 % (ref 43–78)
NRBC # BLD: 0 K/UL (ref 0–0.2)
PLATELET # BLD AUTO: 249 K/UL (ref 150–450)
PMV BLD AUTO: 9.2 FL (ref 9.4–12.3)
POTASSIUM SERPL-SCNC: 4.2 MMOL/L (ref 3.5–5.1)
PROT SERPL-MCNC: 7.6 G/DL (ref 6.3–8.2)
RBC # BLD AUTO: 4.39 M/UL (ref 4.05–5.2)
SODIUM SERPL-SCNC: 142 MMOL/L (ref 136–145)
T4 FREE SERPL-MCNC: 1.2 NG/DL (ref 0.78–1.46)
TSH, 3RD GENERATION: 0.33 UIU/ML (ref 0.36–3.74)
WBC # BLD AUTO: 6.7 K/UL (ref 4.3–11.1)

## 2022-08-08 ENCOUNTER — TELEPHONE (OUTPATIENT)
Dept: INTERNAL MEDICINE CLINIC | Facility: CLINIC | Age: 82
End: 2022-08-08

## 2022-08-08 NOTE — TELEPHONE ENCOUNTER
----- Message from Thomas Saleh sent at 8/8/2022 10:52 AM EDT -----  Subject: Appointment Request    Reason for Call: Established Patient Appointment needed: Routine (Patient   Request) No Script    QUESTIONS    Reason for appointment request? Available appointments did not meet   patient need     Additional Information for Provider? Patient is in need of an appt and   there are no appts available for patient. Please contact patient regarding   this message.  Patient symptoms are headache and sinus.   ---------------------------------------------------------------------------  --------------  Kiah MARTIN  [phone]; OK to leave message on voicemail  ---------------------------------------------------------------------------  --------------  SCRIPT ANSWERS  COVID Screen: Madison Rizo

## 2022-08-18 DIAGNOSIS — E03.9 ACQUIRED HYPOTHYROIDISM: ICD-10-CM

## 2022-08-18 RX ORDER — LEVOTHYROXINE SODIUM 0.1 MG/1
TABLET ORAL
Qty: 90 TABLET | OUTPATIENT
Start: 2022-08-18

## 2022-08-21 NOTE — PROGRESS NOTES
Valaria Severe (:  1940) is a 79 y.o. female,Established patient, here for evaluation of the following chief complaint(s):  Sinusitis (Pt c/o congestion, headaches, sinus drainage, and bilateral ear pain for the last several months that is not getting better.), Sinus Problem, and Dizziness         ASSESSMENT/PLAN:  1. Allergic rhinitis, unspecified seasonality, unspecified trigger  2. Allergic sinusitis  Urged daily use of 1 whole tablet of Allegra  Prescription for Flonase sent to pharmacy  If symptoms fail to improve or worsen by the end of this week patient is to alert provider at which point we may consider antibiotics    - fluticasone (FLONASE) 50 MCG/ACT nasal spray; 2 sprays by Each Nostril route daily  Dispense: 48 g; Refill: 1    3. Acquired hypothyroidism  Labs from 2022 shows TSH mildly suppressed at 0.333, free T4 within normal limits  Confirm that patient is taking levothyroxine appropriately  Check TSH and free T4 in upcoming weeks to determine if dose adjustment is indicated    - T4, Free; Future  - TSH; Future  - levothyroxine (SYNTHROID) 100 MCG tablet; Take 1 tablet by mouth every morning (before breakfast)  Dispense: 90 tablet; Refill: 3    4. Vitamin D deficiency  Vitamin D level low at 21.4 on 2022  Recommended between 2000 and 4000 units of vitamin D3 per day    5. Unsteadiness  Possibly due to to allergic sinusitis  Referred to physical therapy/home health for balance training  Management of allergy symptoms as above    - [address]      Return for f/u 6-8 weeks; EOV; labs prior . Subjective   SUBJECTIVE/OBJECTIVE:  Patient is a 79-year-old  female who presents to the office today accompanied by her  for discussion of sinus issues.   Patient states for the past several months she has had sinus pressure behind her nose, pain behind both ears, occasional sore throat but no pain with swallowing, rare postnasal drip, some bilateral ear fullness and itchy/watery eyes.  states that she may take half a tablet of Allegra once or twice a week at most.  Does use some saline rinse but  states that she has difficulty in administering it. No changes in exposures or household products. Patient denies significant outdoor exposure. No significant rhinorrhea, fevers or chills. Does have occasional clear sputum production coming from her throat. Patient also requesting new referral for home health physical therapy as she still has chronic unsteadiness on her feet, uncertain if it is related to her sinuses. States that she is not able to leave the home without the aid of her . Review of Systems   Constitutional:  Negative for chills and fever. HENT:  Positive for congestion, ear pain, postnasal drip, sinus pressure, sinus pain and sore throat. Negative for rhinorrhea. Positive for bilateral ear fullness   Eyes:  Positive for itching. Neurological:  Positive for headaches. Denies numbness or paresthesias of the feet  Positive for unsteadiness        Objective   Physical Exam  Vitals reviewed. Constitutional:       General: She is not in acute distress. Appearance: Normal appearance. She is not ill-appearing, toxic-appearing or diaphoretic. Interventions: She is not intubated. HENT:      Head: Normocephalic and atraumatic. Right Ear: Ear canal and external ear normal.      Ears:      Comments: Mild pain with pulling of right pinna  Mild effusion noted behind right TM without erythema or perforation  Mild tenderness to palpation of right mastoid process without overlying erythema or ecchymoses  No pain with pulling of left pinna or tenderness to palpation of left mastoid process     Nose: Congestion present.       Comments: Bilateral frontal and maxillary sinus tenderness to palpation     Mouth/Throat:      Mouth: Mucous membranes are moist.      Comments: Low-lying soft palate  Eyes: Extraocular Movements: Extraocular movements intact. Cardiovascular:      Rate and Rhythm: Normal rate and regular rhythm. Heart sounds: No murmur heard. No friction rub. No gallop. Pulmonary:      Effort: Pulmonary effort is normal. No tachypnea, accessory muscle usage, respiratory distress or retractions. She is not intubated. Breath sounds: No stridor. No wheezing, rhonchi or rales. Comments: Some diminished breath sounds at left lung base  Musculoskeletal:         General: No swelling. Cervical back: Neck supple. No tenderness. Right lower leg: No edema. Left lower leg: No edema. Skin:     General: Skin is dry. Coloration: Skin is not jaundiced. Neurological:      Mental Status: She is alert. Psychiatric:         Attention and Perception: Attention normal.         Mood and Affect: Mood normal. Mood is not anxious. Affect is blunt. Affect is not tearful. Speech: Speech normal. Speech is not rapid and pressured or slurred. Behavior: Behavior normal. Behavior is not agitated, aggressive or combative. Behavior is cooperative. Thought Content: Thought content normal.                An electronic signature was used to authenticate this note.     --Juan Reese, DO

## 2022-08-22 ENCOUNTER — OFFICE VISIT (OUTPATIENT)
Dept: INTERNAL MEDICINE CLINIC | Facility: CLINIC | Age: 82
End: 2022-08-22
Payer: MEDICARE

## 2022-08-22 VITALS
DIASTOLIC BLOOD PRESSURE: 66 MMHG | SYSTOLIC BLOOD PRESSURE: 110 MMHG | TEMPERATURE: 97.7 F | WEIGHT: 162 LBS | HEIGHT: 66 IN | HEART RATE: 82 BPM | BODY MASS INDEX: 26.03 KG/M2 | OXYGEN SATURATION: 94 %

## 2022-08-22 DIAGNOSIS — J30.9 ALLERGIC SINUSITIS: ICD-10-CM

## 2022-08-22 DIAGNOSIS — E55.9 VITAMIN D DEFICIENCY: ICD-10-CM

## 2022-08-22 DIAGNOSIS — R26.81 UNSTEADINESS: ICD-10-CM

## 2022-08-22 DIAGNOSIS — E03.9 ACQUIRED HYPOTHYROIDISM: ICD-10-CM

## 2022-08-22 DIAGNOSIS — J30.9 ALLERGIC RHINITIS, UNSPECIFIED SEASONALITY, UNSPECIFIED TRIGGER: Primary | ICD-10-CM

## 2022-08-22 PROCEDURE — 99214 OFFICE O/P EST MOD 30 MIN: CPT | Performed by: STUDENT IN AN ORGANIZED HEALTH CARE EDUCATION/TRAINING PROGRAM

## 2022-08-22 PROCEDURE — 1090F PRES/ABSN URINE INCON ASSESS: CPT | Performed by: STUDENT IN AN ORGANIZED HEALTH CARE EDUCATION/TRAINING PROGRAM

## 2022-08-22 PROCEDURE — G8427 DOCREV CUR MEDS BY ELIG CLIN: HCPCS | Performed by: STUDENT IN AN ORGANIZED HEALTH CARE EDUCATION/TRAINING PROGRAM

## 2022-08-22 PROCEDURE — 1036F TOBACCO NON-USER: CPT | Performed by: STUDENT IN AN ORGANIZED HEALTH CARE EDUCATION/TRAINING PROGRAM

## 2022-08-22 PROCEDURE — G8399 PT W/DXA RESULTS DOCUMENT: HCPCS | Performed by: STUDENT IN AN ORGANIZED HEALTH CARE EDUCATION/TRAINING PROGRAM

## 2022-08-22 PROCEDURE — 1123F ACP DISCUSS/DSCN MKR DOCD: CPT | Performed by: STUDENT IN AN ORGANIZED HEALTH CARE EDUCATION/TRAINING PROGRAM

## 2022-08-22 PROCEDURE — G8417 CALC BMI ABV UP PARAM F/U: HCPCS | Performed by: STUDENT IN AN ORGANIZED HEALTH CARE EDUCATION/TRAINING PROGRAM

## 2022-08-22 RX ORDER — LEVOTHYROXINE SODIUM 0.1 MG/1
100 TABLET ORAL
Qty: 90 TABLET | Refills: 3 | Status: SHIPPED | OUTPATIENT
Start: 2022-08-22

## 2022-08-22 RX ORDER — FLUTICASONE PROPIONATE 50 MCG
2 SPRAY, SUSPENSION (ML) NASAL DAILY
Qty: 48 G | Refills: 1 | Status: SHIPPED | OUTPATIENT
Start: 2022-08-22

## 2022-08-22 ASSESSMENT — ENCOUNTER SYMPTOMS
SINUS PAIN: 1
SORE THROAT: 1
SINUS PRESSURE: 1
RHINORRHEA: 0
EYE ITCHING: 1

## 2022-08-22 ASSESSMENT — PATIENT HEALTH QUESTIONNAIRE - PHQ9
SUM OF ALL RESPONSES TO PHQ QUESTIONS 1-9: 2
SUM OF ALL RESPONSES TO PHQ9 QUESTIONS 1 & 2: 2
2. FEELING DOWN, DEPRESSED OR HOPELESS: 1
SUM OF ALL RESPONSES TO PHQ QUESTIONS 1-9: 2
SUM OF ALL RESPONSES TO PHQ QUESTIONS 1-9: 2
1. LITTLE INTEREST OR PLEASURE IN DOING THINGS: 1
SUM OF ALL RESPONSES TO PHQ QUESTIONS 1-9: 2

## 2022-08-23 ENCOUNTER — HOME HEALTH ADMISSION (OUTPATIENT)
Dept: HOME HEALTH SERVICES | Facility: HOME HEALTH | Age: 82
End: 2022-08-23
Payer: MEDICARE

## 2022-08-23 ENCOUNTER — TELEPHONE (OUTPATIENT)
Dept: INTERNAL MEDICINE CLINIC | Facility: CLINIC | Age: 82
End: 2022-08-23

## 2022-08-23 DIAGNOSIS — J32.9 SINUSITIS, UNSPECIFIED CHRONICITY, UNSPECIFIED LOCATION: ICD-10-CM

## 2022-08-23 DIAGNOSIS — R26.81 UNSTEADINESS: Primary | ICD-10-CM

## 2022-08-23 NOTE — TELEPHONE ENCOUNTER
Naye with [address] [facility] sts unsteady/weakness is not a qualifying diag and if you can just do an addendum on order to include Sinuitis as a diag it will be covered.

## 2022-08-23 NOTE — TELEPHONE ENCOUNTER
New home health order placed with updated diagnosis code.     Orders Placed This Encounter    [address] [facility]     Referral Priority:   Routine     Referral Type:   Home Health Care     Referral Reason:   Specialty Services Required     Requested Specialty:   [facility]     Number of Visits Requested:   1

## 2022-08-25 ENCOUNTER — HOME CARE VISIT (OUTPATIENT)
Dept: SCHEDULING | Facility: HOME HEALTH | Age: 82
End: 2022-08-25
Payer: MEDICARE

## 2022-08-25 VITALS
SYSTOLIC BLOOD PRESSURE: 128 MMHG | HEART RATE: 72 BPM | TEMPERATURE: 97.8 F | RESPIRATION RATE: 18 BRPM | DIASTOLIC BLOOD PRESSURE: 76 MMHG | OXYGEN SATURATION: 95 %

## 2022-08-25 PROCEDURE — 400013 HH SOC

## 2022-08-25 PROCEDURE — G0151 HHCP-SERV OF PT,EA 15 MIN: HCPCS

## 2022-08-25 ASSESSMENT — ENCOUNTER SYMPTOMS
CONSTIPATION: 1
PAIN LOCATION - PAIN QUALITY: ACHE
DYSPNEA ACTIVITY LEVEL: AFTER AMBULATING 10 - 20 FT
INDIGESTION: 1

## 2022-08-30 ENCOUNTER — HOME CARE VISIT (OUTPATIENT)
Dept: SCHEDULING | Facility: HOME HEALTH | Age: 82
End: 2022-08-30
Payer: MEDICARE

## 2022-08-30 VITALS
RESPIRATION RATE: 16 BRPM | OXYGEN SATURATION: 98 % | DIASTOLIC BLOOD PRESSURE: 78 MMHG | TEMPERATURE: 98.5 F | HEART RATE: 72 BPM | SYSTOLIC BLOOD PRESSURE: 128 MMHG

## 2022-08-30 PROCEDURE — G0157 HHC PT ASSISTANT EA 15: HCPCS

## 2022-09-01 ENCOUNTER — HOME CARE VISIT (OUTPATIENT)
Dept: SCHEDULING | Facility: HOME HEALTH | Age: 82
End: 2022-09-01
Payer: MEDICARE

## 2022-09-01 VITALS
HEART RATE: 76 BPM | DIASTOLIC BLOOD PRESSURE: 84 MMHG | OXYGEN SATURATION: 98 % | SYSTOLIC BLOOD PRESSURE: 124 MMHG | RESPIRATION RATE: 16 BRPM | TEMPERATURE: 98.2 F

## 2022-09-01 PROCEDURE — G0157 HHC PT ASSISTANT EA 15: HCPCS

## 2022-09-02 DIAGNOSIS — F41.9 ANXIETY AND DEPRESSION: ICD-10-CM

## 2022-09-02 DIAGNOSIS — F32.A ANXIETY AND DEPRESSION: ICD-10-CM

## 2022-09-02 RX ORDER — DIAZEPAM 5 MG/1
TABLET ORAL
Qty: 60 TABLET | Refills: 2 | Status: SHIPPED | OUTPATIENT
Start: 2022-09-02 | End: 2022-12-02

## 2022-09-02 RX ORDER — DIAZEPAM 5 MG/1
TABLET ORAL
Qty: 60 TABLET | OUTPATIENT
Start: 2022-09-02

## 2022-09-02 NOTE — TELEPHONE ENCOUNTER
Patient requesting refill on diazepam.  PDMP reviewed showing diazepam 5 mg tablets, quantity #60, 30-day supply last filled on 7/29/2022. New prescription with refills sent to pharmacy on record.     Orders Placed This Encounter    diazePAM (VALIUM) 5 MG tablet     Sig: TAKE 1/2 TO 1 TABLET BY MOUTH TWICE DAILY AS NEEDED FOR ANXIETY     Dispense:  60 tablet     Refill:  2

## 2022-09-06 ENCOUNTER — HOME CARE VISIT (OUTPATIENT)
Dept: SCHEDULING | Facility: HOME HEALTH | Age: 82
End: 2022-09-06
Payer: MEDICARE

## 2022-09-06 VITALS — HEART RATE: 75 BPM | RESPIRATION RATE: 16 BRPM | TEMPERATURE: 98.1 F | OXYGEN SATURATION: 96 %

## 2022-09-06 PROCEDURE — G0157 HHC PT ASSISTANT EA 15: HCPCS

## 2022-09-09 ENCOUNTER — HOME CARE VISIT (OUTPATIENT)
Dept: SCHEDULING | Facility: HOME HEALTH | Age: 82
End: 2022-09-09
Payer: MEDICARE

## 2022-09-09 PROCEDURE — G0157 HHC PT ASSISTANT EA 15: HCPCS

## 2022-09-12 ENCOUNTER — HOME CARE VISIT (OUTPATIENT)
Dept: SCHEDULING | Facility: HOME HEALTH | Age: 82
End: 2022-09-12
Payer: MEDICARE

## 2022-09-12 VITALS
TEMPERATURE: 97.9 F | HEART RATE: 76 BPM | OXYGEN SATURATION: 94 % | DIASTOLIC BLOOD PRESSURE: 66 MMHG | RESPIRATION RATE: 18 BRPM | SYSTOLIC BLOOD PRESSURE: 108 MMHG

## 2022-09-12 VITALS
TEMPERATURE: 98 F | RESPIRATION RATE: 18 BRPM | OXYGEN SATURATION: 95 % | SYSTOLIC BLOOD PRESSURE: 118 MMHG | DIASTOLIC BLOOD PRESSURE: 70 MMHG | HEART RATE: 76 BPM

## 2022-09-12 PROCEDURE — G0151 HHCP-SERV OF PT,EA 15 MIN: HCPCS

## 2022-09-12 ASSESSMENT — ENCOUNTER SYMPTOMS
PAIN LOCATION - PAIN QUALITY: ACHING
PAIN LOCATION - PAIN QUALITY: JUST HURTS
DYSPNEA ACTIVITY LEVEL: AFTER AMBULATING MORE THAN 20 FT

## 2022-09-28 ENCOUNTER — TELEPHONE (OUTPATIENT)
Dept: INTERNAL MEDICINE CLINIC | Facility: CLINIC | Age: 82
End: 2022-09-28

## 2022-09-30 NOTE — TELEPHONE ENCOUNTER
2021       Rosario Damon DO  [address] [facility]  [address]  [place] [address]  Via Fax: [phone]      Patient: Ese Tyson   YOB: 1946   Date of Visit: 2021       Dear Dr. Damon:    Thank you for referring Ese Tyson to me for evaluation. Below are my notes for this visit with her.    If you have questions, please do not hesitate to call me. I look forward to following your patient along with you.      Sincerely,        Maximiliano Thompson MD        CC: No Recipients  Maximiliano Thompson MD  2021  1:05 PM  Signed  Most recent gynecologic oncology visit: 20    Chief Complaint/Reason for Visit:  Complex left ovarian cyst    History of Present Illness:  Pt is a 74 year old year old female who has a history of a hysterectomy performed when she was 48 after having a baby that  after birth, unfortunately records are unavailable at the time of appointment. Most recently, on 18, presented to [facility] Emergency Department with complaints of lower back pain. A CT was obtained.     CT of abdomen and pelvis performed 18 showed:  -No evidence for urinary tract obstruction or definite acute process within the abdomen or pelvis.   -Prominent left adnexal/ovarian mass. Recommend transvaginal sonography for further characterization.    She had a consult with Dr. Rosario Damon on 18 where a pelvic ultrasound and a  were obtained.     Pelvic ultrasound performed 18 showed:  -Uterus: Hysterectomy noted.  -Right ovary: 2.2 cm max.   -Left ovary: 5.8 cm max.  -Left ovarian cyst: 4.7 x 3.8 x 5.6 x 4.67 cm complex cyst.     Her office visit with me on 18, it was noted, \"Given the complexity of the mass on the ultrasound, I would put the chance of this being a cancer at least one in 5, 20%. My recommendation normally would be to remove this mass. The patient is very very resistant to this idea, given her perception of very bad interactions with the 
Pt called needing refill of Levothyroxine(Synthroid) 100 mcg tablet. The pharmacy is [facility] in [facility] on [address] [facility].  Please advise    Thank you    Fara Lawrence
healthcare system in the past. I told her another option would be to follow this radiographically. We will performed Ova-1 now, then perform ultrasound of pelvis as well as CA-125 in 6 months and see her after that.\"    Pelvic ultrasound performed on 2/22/19 showed:  -Uterus: Surgically absent  -Left ovary: 6.5 x 5.9 x 4.6 cm   -Complex cyst: 5.6 x 5.3 x 4.5 cm    Pelvic ultrasound performed 12/6/19 showed:  -Uterus: Surgically absent  -Right ovary: Not visualized  -Left ovary: 7.3 x 5.1 x 6.1 cm   -Complex cyst: 6.8 x 5.6 x 4.7 cm    Pelvic ultrasound performed 5/8/20 showed:  -Uterus: Surgically absent  -Right ovary: Not visualized  -Left ovary: 7.7 x 5.8 x 5.3 cm   -Complex cyst: 6.7 x 5.7 x 5.5 cm    Pelvic ultrasound performed 4/28/21 showed:  -Uterus: Surgically absent  -Right ovary: Not visualized  -Left ovary: 8.2 x 5.9 x 6.8 cm   -Cystic lesion: 7.3 x 5.9 x 5.9 cm    : <5.5 (8/1/18), 2 (2/12/19), 3 (12/30/19), 3 (5/8/20), pending (8/11/21)  OVA 1: 3.4 (8/8/18)    She now presents for follow up.  She reports that she is feeling well overall today.  She does note “feel more pressure building up noticing this for the last few months on the left side.  States “I just know it is there.”  Reports that the pain is intermittent and dull.  She denies any bowel or bladder function changes or concerns.  Reports chronic leg and ankle swelling bilaterally.  “I try to elevate dilated throughout the day.”  Denies abdominal pain with the exception of the left lower quadrant intermittent pain.  Denies abdominal bloating.  Denies vaginal bleeding or discharge.  Reports that her weight is stable.  Reports a good appetite and denies any early satiety.    On her review of systems she notes ongoing intermittent headaches.  Specifically to the right side of the back of her head.  Reports diabetes for which she is medically managed.  Reports ongoing allergy symptoms including coughing, runny nose, scratchy throat.  Reports 
painful hips and shoulders due to arthritis.  She reports her pain as a 4/10 (see with 0 being no pain and 10 being the worst pain).  The pain is in her back which is constant and worse with activity.                                 Most recent mammogram: 8/19/19  Most recent colonoscopy: 1/8/15    Review of Systems:  A 14 item review of systems was performed and all responses were negative except as noted in the above \"History of Present Illness\".     Distress Thermometer:  Three.  Nasal congestion.  Pain.  Dry itchy skin.  Sleep disturbances.    Medications:  Current Outpatient Medications   Medication Sig   • NON FORMULARY TAKE 1 CAPSULE BY MOUTH TWICE A DAY FOR EYE HEALTH   • amLODIPine (NORVASC) 5 MG tablet TAKE ONE TABLET BY MOUTH EVERY DAY - FOR BLOOD PRESSURE - AVOID GRAPEFRUIT AND ITS JUICE WITH THIS DRUG   • calcium carbonate (OS-JDUY) 1250 (500 Ca) MG chewable tablet CHEW TWO TABLETS BY MOUTH THREE TIMES A DAY   • CARBOXYMethylcellulose (REFRESH PLUS) 0.5 % ophthalmic solution PLACE 1 DROP IN EACH EYE TWICE A DAY TO LUBRICATE EYES. REPLACES PREVIOUS ARTIFICIAL TEARS   • Dextrose, Diabetic Use, (Glucose) 77.4 % Gel TAKE 15 GRAMS (=ONE-HALF CONTENTS OF TUBE) BY MOUTH  AS NEEDED FOR LOW BLOOD SUGAR   • fluticasone (FLONASE) 50 MCG/ACT nasal spray USE 2 SPRAYS IN EACH NOSTRIL EVERY DAY FOR NASAL SYMPTOMS   • losartan (COZAAR) 100 MG tablet TAKE ONE TABLET BY MOUTH EVERY DAY FOR BLOOD PRESSURE   • omeprazole (PrilOSEC) 20 MG capsule TAKE ONE CAPSULE BY MOUTH EVERY DAY TAKE ON AN EMPTY STOMACH 30 MINUTES PRIOR TO A MEAL FOR STOMACH ACID   • rosuvastatin (CRESTOR) 20 MG tablet TAKE ONE-HALF TABLET BY MOUTH EVERY EVENING TO LOWER CHOLESTEROL AND DECREASE RISK OF STROKE AND HEART ATTACK   • insulin regular, human, (HUMULIN R, NOVOLIN R) 100 UNIT/ML injectable solution -199: 1 unit  -249: 3 units  -299: 5 units  -349: 7 units  BG Over 350: 8 units  BG over 400, call MD.   • Alcohol Swabs 
(ALCOHOL PADS) 70 % Pads Use as directed.   • Blood Glucose Monitoring Suppl (ONE TOUCH ULTRA 2) w/Device Kit    • DULoxetine (CYMBALTA) 30 MG capsule Take 30 mg by mouth.   • insulin aspart (NOVOLOG MIX) 70-30 (100 UNIT/ML) injectable suspension 28 units in AM and 10 units in PM.   • Insulin Pen Needle (PEN NEEDLES 5/16\") 30G X 8 MM Misc To be used with lantus solostar pen. Ok to substitute preferred product.   • lidocaine (LIDODERM) 5 % patch Place 1 patch onto the skin.   • blood glucose test strip    • Lancets 28G Misc    • Acetaminophen (TYLENOL) 325 MG Cap    • Cholecalciferol (VITAMIN D) 2000 units capsule    • ferrous sulfate 325 (65 FE) MG tablet Take 1 tablet by mouth daily (with breakfast).   • alendronate (FOSAMAX) 70 MG tablet Take 1 tablet by mouth every 7 days.   • cyclobenzaprine (FLEXERIL) 10 MG tablet Take 1 tablet by mouth 3 times daily as needed for Muscle spasms.   • insulin aspart (NOVOLOG) 100 UNIT/ML correction dose Inject into the skin 3 times daily (before meals).   • gabapentin (NEURONTIN) 100 MG capsule Take 1 capsule by mouth 3 times daily.   • HYDROcodone-acetaminophen (NORCO) 5-325 MG per tablet Take 1 tablet by mouth every 6 hours as needed.     No current facility-administered medications for this visit.     Please see EPIC for a complete list of medications.    Allergies:  ALLERGIES:  Shellfish allergy   (food or med), Latex   (environmental), Povidone iodine, Contrast media, Hydrochlorothiazide, Albuterol, Cefprozil, Dextromethorphan, Iodinated diagnostic agents, Levofloxacin, Lovastatin, Metal topical   (environmental), Metformin, Penicillins, Propoxyphene n-apap, and Salicylates    Past Medical History:  Past Medical History:   Diagnosis Date   • AF (atrial fibrillation) (CMS/HCC)    • Anemia    • Blood transfusion abn reaction or complication, no procedure mishap, initial encounter    • Derangement of knee    • Diabetes mellitus (CMS/HCC)    • GERD (gastroesophageal reflux 
disease)    • History of back surgery    • Menopause    • Obesity    • PONV (postoperative nausea and vomiting)    • Radial head fracture      Past Surgical History:  Past Surgical History:   Procedure Laterality Date   • Appendectomy     • Cataract extrac w/ intraocular lens imp&ant vit,bilaterl     •  delivery only     •  delivery only     • Cholecystectomy     • Colonoscopy     • Explore parathyroid glands     • Eye surgery     • Knee scope,diagnostic Left    • Ligate fallopian tube Bilateral    • Lumbar spine surgery     • Parathyroidectomy     • Remove tonsils/adenoids,<10 y/o     • Vaginal hysterectomy  1994     Family History:  Family History   Problem Relation Age of Onset   • Leukemia Mother         ALL   • Cancer, Liver Father 82       Social History:   Social History     Socioeconomic History   • Marital status:      Spouse name: Not on file   • Number of children: 2   • Years of education: Not on file   • Highest education level: Not on file   Occupational History   • Occupation: retired   Tobacco Use   • Smoking status: Former Smoker     Packs/day: 3.00     Years: 20.00     Pack years: 60.00     Types: Cigarettes   • Smokeless tobacco: Never Used   Substance and Sexual Activity   • Alcohol use: No   • Drug use: No   • Sexual activity: Never   Other Topics Concern   • Not on file   Social History Narrative    Spouse passed away in , after 51 years of marriage. He had dementia and fell multiple times and hit head.      Social Determinants of Health     Financial Resource Strain:    • Social Determinants: Financial Resource Strain:    Food Insecurity:    • Social Determinants: Food Insecurity:    Transportation Needs:    • Lack of Transportation (Medical):    • Lack of Transportation (Non-Medical):    Physical Activity:    • Days of Exercise per Week:    • Minutes of Exercise per Session:    Stress:    • Social Determinants: Stress:    Social Connections:    • 
Social Determinants: Social Connections:    Intimate Partner Violence:    • Social Determinants: Intimate Partner Violence Past Fear:    • Social Determinants: Intimate Partner Violence Current Fear:      PHYSICAL EXAM  Constitutional:   General appearance:  Obese, well-groomed female in no obvious distress.      Vitals:    Visit Vitals  BP (!) 152/68   Pulse 64   Temp 96.6 °F (35.9 °C)   Resp 14   Ht 5' 6\" (1.676 m)   Wt 126.6 kg   BMI 45.05 kg/m²     Neurological/Psychiatric:  Orientation: Alert to time, place and person. Mood and affect:  Pleasant, cooperative, slightly apprehensive.  Neck: Appears normal, with no masses, asymmetry, with a midline trachea. The thyroid is not enlarged, tender or with any masses.   Skin: No lesions or ulcers.   Respiratory: Good respiratory effort, with no use of accessory muscles. Good diaphragm movement. Lungs are clear to auscultation with no abnormal breath sounds.   Cardiovascular: Auscultation of heart reveals no abnormal sounds or murmurs. The peripheral vascular system shows full pulses and no edema, or tenderness.   Chest (Breasts): Please see \"Genitourinary\" below.   Gastrointestinal (Abdomen): No masses, tenderness, hernia, or hepatosplenomegaly. Stool sample for occult blood test not indicated.   Lymphatic: No palpable lymph nodes in neck, axillae, or groin.   Genitourinary:   Breast are not examined  Pelvic exam   External genitalia: Normal appearance with no lesions.    Urethra meatus is of normal size and locations, with no lesions or prolapse.    Urethra shows no masses, tenderness of scarring.   Bladder shows no tenderness, fullness or masses.    Vagina has normal appearance, with no discharge, lesions, or prolapse.    Cervix: Surgically absent   Uterus: Surgically absent   Adnexa/Parametria:    Anus and Perineum: No lesions noted.   Digital rectal exam: Good sphincter tone, no masses. No obvious hemorrhoids.     Data Reviewed:  Previous office visit notes, imaging 
and lab    Tests Ordered:   today following exam   in 1 year  Pelvic ultrasound in 1 year    Assessment and Plan:   Complex left ovarian cyst, with normal tumor markers but increase in size.  She is doing well today.  She will have her  drawn today.  We will review those results once available.  She will have a  and pelvic ultrasound in 1 year to monitor the cyst growth.      She continues to be apprehensive to proceed with any surgical interventions.  We discussed that the likelihood of this ovarian cyst to be cancer is very low, but not 0.  I discussed that as the mass gets bigger, there is more chance of torsion creating a surgical emergency.      Per her wishes, we will continue to monitor the  and if it does go up surgical intervention would be warranted.  I informed her that we can continue to monitor the growth of the cyst and once it reaches 10 cm surgery would be recommended.  We discussed that this is related to possible torsion which can cause complications.      The plan is she will see me in one year or sooner as needed.  She was encouraged to call me should she have any questions or concerns.    Dr. Damon, thank you so much for allowing me to help you care for Ms. Tyson.  Please contact me should you have any questions or concerns whatsoever. I look forward to being of further service to you and will continue to keep you informed of any and all subsequent developments in her care from my standpoint.                  
weight-bearing as tolerated

## 2022-10-05 NOTE — PROGRESS NOTES
Kale Pisano (:  1940) is a 79 y.o. female,Established patient, here for evaluation of the following chief complaint(s):  Depression         ASSESSMENT/PLAN:  1. Anxiety and depression  PHQ-9 score of 4 with RACHID-7 score of 0 on 3/23/2022  PHQ-9 score of 11, RACHID-7 score of 1 in the office today  Has previously been on Zoloft but admits she did not take for more than 3 days at a time. Counseled patient on mechanisms of as needed and maintenance medications for anxiety and depression stating it can take several weeks to months for maintenance medication to take effect  Counseled patient to take maintenance medication every day around the same time of day, counseled on potential adverse effects and to contact provider should there be any questions or concerns  Patient agreeable to starting maintenance medication  Start Prozac, follow-up in office in 4 weeks or sooner if needed    Return in about 4 weeks (around 11/3/2022) for EOV with Qiana Matos or Alaina Cortez . Subjective   SUBJECTIVE/OBJECTIVE:  Patient is an 79-year-old  female who presents to the office today, new by her  to discuss worsening depression. Does have history of anxiety and depression in the past.  Has been on diazepam for decades which seems to help with her anxiety. Notes worsening depression over the past several months. In large part due to her difficulty in getting around secondary to unsteadiness on her feet despite undergoing multiple rounds of physical therapy. Has had less interaction with other people but still talks with her daughters and cousins regularly. Does have a walker and wheelchair but has not used this to get outside the house. Does feel that her quality of life is impacted with her  having to do more of the household chores which contributes to her depression. Patient sleeps 9 hours at night without snoring or apnea per her  but still sleeps during the day. Appetite is variable. No suicidal or homicidal thoughts. Review of Systems   Constitutional:  Positive for appetite change (somewhat decreased). Psychiatric/Behavioral:  Positive for sleep disturbance. Negative for self-injury and suicidal ideas. Positive for depression        Objective   Physical Exam  Vitals reviewed. Constitutional:       General: She is not in acute distress. Appearance: Normal appearance. She is not ill-appearing, toxic-appearing or diaphoretic. HENT:      Head: Normocephalic and atraumatic. Eyes:      General:         Right eye: No discharge. Left eye: No discharge. Extraocular Movements: Extraocular movements intact. Cardiovascular:      Rate and Rhythm: Normal rate and regular rhythm. Heart sounds: No murmur heard. No friction rub. No gallop. Pulmonary:      Effort: Pulmonary effort is normal. No respiratory distress. Breath sounds: No wheezing, rhonchi or rales. Skin:     General: Skin is warm and dry. Coloration: Skin is not jaundiced. Neurological:      Mental Status: She is alert. Psychiatric:         Attention and Perception: Attention normal.         Mood and Affect: Mood is depressed. Affect is blunt. Speech: Speech normal. Speech is not rapid and pressured or slurred. Behavior: Behavior normal. Behavior is not agitated, aggressive or combative. Behavior is cooperative. Thought Content: Thought content does not include homicidal or suicidal plan. Cognition and Memory: Memory is impaired. An electronic signature was used to authenticate this note.     --Toni Monroe DO

## 2022-10-06 ENCOUNTER — OFFICE VISIT (OUTPATIENT)
Dept: INTERNAL MEDICINE CLINIC | Facility: CLINIC | Age: 82
End: 2022-10-06
Payer: MEDICARE

## 2022-10-06 VITALS
BODY MASS INDEX: 25.94 KG/M2 | SYSTOLIC BLOOD PRESSURE: 138 MMHG | HEIGHT: 66 IN | HEART RATE: 76 BPM | OXYGEN SATURATION: 95 % | TEMPERATURE: 98.8 F | WEIGHT: 161.4 LBS | DIASTOLIC BLOOD PRESSURE: 88 MMHG

## 2022-10-06 DIAGNOSIS — F32.A ANXIETY AND DEPRESSION: Primary | ICD-10-CM

## 2022-10-06 DIAGNOSIS — F41.9 ANXIETY AND DEPRESSION: Primary | ICD-10-CM

## 2022-10-06 PROCEDURE — G8417 CALC BMI ABV UP PARAM F/U: HCPCS | Performed by: STUDENT IN AN ORGANIZED HEALTH CARE EDUCATION/TRAINING PROGRAM

## 2022-10-06 PROCEDURE — 1090F PRES/ABSN URINE INCON ASSESS: CPT | Performed by: STUDENT IN AN ORGANIZED HEALTH CARE EDUCATION/TRAINING PROGRAM

## 2022-10-06 PROCEDURE — G8427 DOCREV CUR MEDS BY ELIG CLIN: HCPCS | Performed by: STUDENT IN AN ORGANIZED HEALTH CARE EDUCATION/TRAINING PROGRAM

## 2022-10-06 PROCEDURE — 99214 OFFICE O/P EST MOD 30 MIN: CPT | Performed by: STUDENT IN AN ORGANIZED HEALTH CARE EDUCATION/TRAINING PROGRAM

## 2022-10-06 PROCEDURE — 1036F TOBACCO NON-USER: CPT | Performed by: STUDENT IN AN ORGANIZED HEALTH CARE EDUCATION/TRAINING PROGRAM

## 2022-10-06 PROCEDURE — 1123F ACP DISCUSS/DSCN MKR DOCD: CPT | Performed by: STUDENT IN AN ORGANIZED HEALTH CARE EDUCATION/TRAINING PROGRAM

## 2022-10-06 PROCEDURE — G8484 FLU IMMUNIZE NO ADMIN: HCPCS | Performed by: STUDENT IN AN ORGANIZED HEALTH CARE EDUCATION/TRAINING PROGRAM

## 2022-10-06 PROCEDURE — G8399 PT W/DXA RESULTS DOCUMENT: HCPCS | Performed by: STUDENT IN AN ORGANIZED HEALTH CARE EDUCATION/TRAINING PROGRAM

## 2022-10-06 RX ORDER — FLUOXETINE HYDROCHLORIDE 20 MG/1
20 CAPSULE ORAL DAILY
Qty: 90 CAPSULE | Refills: 1 | Status: SHIPPED | OUTPATIENT
Start: 2022-10-06

## 2022-10-06 ASSESSMENT — ANXIETY QUESTIONNAIRES
4. TROUBLE RELAXING: 0
7. FEELING AFRAID AS IF SOMETHING AWFUL MIGHT HAPPEN: 0
5. BEING SO RESTLESS THAT IT IS HARD TO SIT STILL: 0
IF YOU CHECKED OFF ANY PROBLEMS ON THIS QUESTIONNAIRE, HOW DIFFICULT HAVE THESE PROBLEMS MADE IT FOR YOU TO DO YOUR WORK, TAKE CARE OF THINGS AT HOME, OR GET ALONG WITH OTHER PEOPLE: SOMEWHAT DIFFICULT
1. FEELING NERVOUS, ANXIOUS, OR ON EDGE: 1
GAD7 TOTAL SCORE: 1
6. BECOMING EASILY ANNOYED OR IRRITABLE: 0
3. WORRYING TOO MUCH ABOUT DIFFERENT THINGS: 0
2. NOT BEING ABLE TO STOP OR CONTROL WORRYING: 0

## 2022-10-06 ASSESSMENT — PATIENT HEALTH QUESTIONNAIRE - PHQ9
4. FEELING TIRED OR HAVING LITTLE ENERGY: 2
1. LITTLE INTEREST OR PLEASURE IN DOING THINGS: 3
SUM OF ALL RESPONSES TO PHQ QUESTIONS 1-9: 11
3. TROUBLE FALLING OR STAYING ASLEEP: 1
10. IF YOU CHECKED OFF ANY PROBLEMS, HOW DIFFICULT HAVE THESE PROBLEMS MADE IT FOR YOU TO DO YOUR WORK, TAKE CARE OF THINGS AT HOME, OR GET ALONG WITH OTHER PEOPLE: 1
7. TROUBLE CONCENTRATING ON THINGS, SUCH AS READING THE NEWSPAPER OR WATCHING TELEVISION: 1
5. POOR APPETITE OR OVEREATING: 1
SUM OF ALL RESPONSES TO PHQ9 QUESTIONS 1 & 2: 5
SUM OF ALL RESPONSES TO PHQ QUESTIONS 1-9: 11
2. FEELING DOWN, DEPRESSED OR HOPELESS: 2
9. THOUGHTS THAT YOU WOULD BE BETTER OFF DEAD, OR OF HURTING YOURSELF: 0
SUM OF ALL RESPONSES TO PHQ QUESTIONS 1-9: 11
8. MOVING OR SPEAKING SO SLOWLY THAT OTHER PEOPLE COULD HAVE NOTICED. OR THE OPPOSITE, BEING SO FIGETY OR RESTLESS THAT YOU HAVE BEEN MOVING AROUND A LOT MORE THAN USUAL: 0
SUM OF ALL RESPONSES TO PHQ QUESTIONS 1-9: 11
6. FEELING BAD ABOUT YOURSELF - OR THAT YOU ARE A FAILURE OR HAVE LET YOURSELF OR YOUR FAMILY DOWN: 1

## 2022-10-24 ENCOUNTER — TELEPHONE (OUTPATIENT)
Dept: INTERNAL MEDICINE CLINIC | Facility: CLINIC | Age: 82
End: 2022-10-24

## 2022-10-24 NOTE — TELEPHONE ENCOUNTER
Fall last week. Pt having trouble breathing, suspected cracked ribs, and leg weakness. Advised pt daughter to take pt to the ER and schedule FU. Pt daughter requested an appointment for separate concern of memory problems. Scheduled visit on 11/8/22 with Marisa to discuss memory concerns.

## 2022-11-03 ENCOUNTER — HOSPITAL ENCOUNTER (OUTPATIENT)
Dept: GENERAL RADIOLOGY | Age: 82
Discharge: HOME | End: 2022-11-06
Payer: MEDICARE

## 2022-11-03 ENCOUNTER — OFFICE VISIT (OUTPATIENT)
Dept: INTERNAL MEDICINE CLINIC | Facility: CLINIC | Age: 82
End: 2022-11-03
Payer: MEDICARE

## 2022-11-03 VITALS
OXYGEN SATURATION: 94 % | DIASTOLIC BLOOD PRESSURE: 89 MMHG | TEMPERATURE: 97.2 F | HEIGHT: 66 IN | HEART RATE: 68 BPM | RESPIRATION RATE: 16 BRPM | BODY MASS INDEX: 26.45 KG/M2 | SYSTOLIC BLOOD PRESSURE: 130 MMHG

## 2022-11-03 DIAGNOSIS — M54.50 ACUTE MIDLINE LOW BACK PAIN WITHOUT SCIATICA: ICD-10-CM

## 2022-11-03 DIAGNOSIS — M54.2 NECK PAIN: ICD-10-CM

## 2022-11-03 DIAGNOSIS — M25.511 ACUTE PAIN OF RIGHT SHOULDER: ICD-10-CM

## 2022-11-03 DIAGNOSIS — R26.81 GAIT INSTABILITY: ICD-10-CM

## 2022-11-03 DIAGNOSIS — R07.81 RIB PAIN ON RIGHT SIDE: ICD-10-CM

## 2022-11-03 DIAGNOSIS — Z91.81 AT HIGH RISK FOR FALLS: ICD-10-CM

## 2022-11-03 DIAGNOSIS — R29.6 RECURRENT FALLS: Primary | ICD-10-CM

## 2022-11-03 PROCEDURE — G8417 CALC BMI ABV UP PARAM F/U: HCPCS | Performed by: NURSE PRACTITIONER

## 2022-11-03 PROCEDURE — 1090F PRES/ABSN URINE INCON ASSESS: CPT | Performed by: NURSE PRACTITIONER

## 2022-11-03 PROCEDURE — G8484 FLU IMMUNIZE NO ADMIN: HCPCS | Performed by: NURSE PRACTITIONER

## 2022-11-03 PROCEDURE — 1036F TOBACCO NON-USER: CPT | Performed by: NURSE PRACTITIONER

## 2022-11-03 PROCEDURE — G8427 DOCREV CUR MEDS BY ELIG CLIN: HCPCS | Performed by: NURSE PRACTITIONER

## 2022-11-03 PROCEDURE — 72050 X-RAY EXAM NECK SPINE 4/5VWS: CPT

## 2022-11-03 PROCEDURE — 72100 X-RAY EXAM L-S SPINE 2/3 VWS: CPT

## 2022-11-03 PROCEDURE — 73030 X-RAY EXAM OF SHOULDER: CPT

## 2022-11-03 PROCEDURE — G8399 PT W/DXA RESULTS DOCUMENT: HCPCS | Performed by: NURSE PRACTITIONER

## 2022-11-03 PROCEDURE — 1123F ACP DISCUSS/DSCN MKR DOCD: CPT | Performed by: NURSE PRACTITIONER

## 2022-11-03 PROCEDURE — 99214 OFFICE O/P EST MOD 30 MIN: CPT | Performed by: NURSE PRACTITIONER

## 2022-11-03 PROCEDURE — 71100 X-RAY EXAM RIBS UNI 2 VIEWS: CPT

## 2022-11-03 ASSESSMENT — ENCOUNTER SYMPTOMS
COUGH: 0
BACK PAIN: 1
DIARRHEA: 0
ABDOMINAL PAIN: 0
VOMITING: 0
SORE THROAT: 0
WHEEZING: 0
SHORTNESS OF BREATH: 0
NAUSEA: 0

## 2022-11-03 ASSESSMENT — PATIENT HEALTH QUESTIONNAIRE - PHQ9
1. LITTLE INTEREST OR PLEASURE IN DOING THINGS: 0
SUM OF ALL RESPONSES TO PHQ QUESTIONS 1-9: 0
SUM OF ALL RESPONSES TO PHQ9 QUESTIONS 1 & 2: 0
SUM OF ALL RESPONSES TO PHQ QUESTIONS 1-9: 0
SUM OF ALL RESPONSES TO PHQ QUESTIONS 1-9: 0
2. FEELING DOWN, DEPRESSED OR HOPELESS: 0
SUM OF ALL RESPONSES TO PHQ QUESTIONS 1-9: 0

## 2022-11-03 NOTE — PROGRESS NOTES
[facility]      11/3/2022    Patient Name: Shira Ramsay  :  1940      Chief Complaint:  Chief Complaint   Patient presents with    Fall    Extremity Weakness         HPI  Patient presents today accompanied by her  with complaint of recent falls and leg weakness.  reports that he has noticed increased weakness, gait instability and recurrent falls over the past 6-12 months. He reports, \"Her legs won't hold her up\". Most recent fall occurred 9 days ago. He says that she was in the bathroom and fell backwards. She came down on her right side. She has complaint of right shoulder, right rib, neck and low back pain since the fall. Patient's daughter called our office and was advised to take patient to the ER. However, patient was never evaluated. Patient reports that she did hit her head when she fell. She denies LOC. Since that time, she denies any headaches, vision disturbance, lethargy, N/V or stroke-like symptoms. Cognition is at baseline. She denies any recent fever or illness. She denies any urinary symptoms. She denies any associated dizziness, lightheadedness, SOB, CP or palpitations.          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    Degenerative disc disease, lumbar 3/24/2021    Dizziness     GERD (gastroesophageal reflux disease)     Hypercholesterolemia     Hypertension     Menopause     Psychiatric disorder     anxiety    Thyroid disease        Past Surgical History:   Procedure Laterality Date    BREAST BIOPSY Left     benign tumor     CARDIAC CATHETERIZATION      CHOLECYSTECTOMY      COLONOSCOPY      CORONARY ARTERY BYPASS GRAFT      CABG x 2     CORONARY ARTERY BYPASS GRAFT      LEFT HEART CATH,PERCUTANEOUS      THYROIDECTOMY, PARTIAL      for benign mass    WISDOM TOOTH EXTRACTION         Family History   Problem Relation Age of Onset    Hypertension Daughter     Cancer Brother 27        brain     Other Daughter         fibromyalgia     Heart Disease Daughter         irregular heart beat     Heart Disease Father 78        MI    Breast Cancer Paternal Aunt 79    Breast Cancer Maternal Aunt     Alzheimer's Disease Mother     Lupus Daughter        Social History     Tobacco Use    Smoking status: Former     Packs/day: 1.00     Types: Cigarettes    Smokeless tobacco: Never   Vaping Use    Vaping Use: Never used   Substance Use Topics    Alcohol use: No       Current Outpatient Medications:     FLUoxetine (PROZAC) 20 MG capsule, Take 1 capsule by mouth daily, Disp: 90 capsule, Rfl: 1    diazePAM (VALIUM) 5 MG tablet, TAKE 1/2 TO 1 TABLET BY MOUTH TWICE DAILY AS NEEDED FOR ANXIETY, Disp: 60 tablet, Rfl: 2    calcium carbonate (TUMS) 500 MG chewable tablet, Take 1 tablet by mouth nightly as needed for Heartburn., Disp: , Rfl:     bisacodyl (DULCOLAX) 5 MG EC tablet, Take 5 mg by mouth daily as needed for Constipation. , Disp: , Rfl:     levothyroxine (SYNTHROID) 100 MCG tablet, Take 1 tablet by mouth every morning (before breakfast), Disp: 90 tablet, Rfl: 3    fluticasone (FLONASE) 50 MCG/ACT nasal spray, 2 sprays by Each Nostril route daily, Disp: 48 g, Rfl: 1    metoprolol succinate (TOPROL XL) 25 MG extended release tablet, Take 25 mg by mouth daily, Disp: , Rfl:     acetaminophen (TYLENOL) 500 MG tablet, Take 500 mg by mouth every 6 hours as needed for Pain, Disp: , Rfl:     aspirin 81 MG EC tablet, Take 81 mg by mouth daily, Disp: , Rfl:     rosuvastatin (CRESTOR) 5 MG tablet, Take 5 mg by mouth daily, Disp: , Rfl:     Allergies   Allergen Reactions    Atorvastatin     Azithromycin Other (See Comments) and Hives    Ciprofloxacin Other (See Comments)    Erythromycin     Erythromycin Base Other (See Comments)    Iodides Other (See Comments)    Rosuvastatin Other (See Comments)     Pt has to take BRAND only  Pt has to take BRAND only    Simvastatin     Statins Myalgia    Tetanus Toxoid, Adsorbed     Aspartame Rash    Penicillins Rash       Review of Systems   Constitutional:  Negative for fever. HENT:  Negative for congestion, ear pain and sore throat. Eyes:  Negative for visual disturbance. Respiratory:  Negative for cough, shortness of breath and wheezing. Cardiovascular:  Negative for chest pain, palpitations and leg swelling. Gastrointestinal:  Negative for abdominal pain, diarrhea, nausea and vomiting. Genitourinary:  Negative for dysuria, frequency, hematuria and urgency. Musculoskeletal:  Positive for arthralgias, back pain, gait problem, myalgias and neck pain. Negative for neck stiffness. Neurological:  Positive for weakness. Negative for dizziness, syncope, facial asymmetry, speech difficulty, light-headedness, numbness and headaches. Objective:  /89 (Site: Left Upper Arm, Position: Sitting, Cuff Size: Medium Adult)   Pulse 68   Temp 97.2 °F (36.2 °C) (Temporal)   Resp 16   Ht 5' 5.5\" (1.664 m)   SpO2 94%   BMI 26.45 kg/m²     Examination:  Physical Exam  Vitals and nursing note reviewed. Constitutional:       General: She is not in acute distress. Appearance: Normal appearance. HENT:      Right Ear: Tympanic membrane, ear canal and external ear normal.      Left Ear: Tympanic membrane, ear canal and external ear normal.      Nose: Nose normal.      Mouth/Throat:      Mouth: Mucous membranes are moist.      Pharynx: Oropharynx is clear. Eyes:      Extraocular Movements: Extraocular movements intact. Pupils: Pupils are equal, round, and reactive to light. Cardiovascular:      Rate and Rhythm: Normal rate and regular rhythm. Pulmonary:      Effort: Pulmonary effort is normal. No respiratory distress. Breath sounds: Normal breath sounds. Chest:      Chest wall: Tenderness present. Comments: Mild right-sided right pain just below right breast  Abdominal:      General: Bowel sounds are normal.      Palpations: Abdomen is soft. Tenderness: There is no abdominal tenderness. Musculoskeletal:      Right shoulder: Tenderness present. Decreased range of motion. Cervical back: Tenderness present. No bony tenderness. No pain with movement. Normal range of motion. Lumbar back: No tenderness. Decreased range of motion. Right lower leg: No edema. Left lower leg: No edema. Comments: Mild tenderness to palpation of bilateral trapezius muscles. Skin:     General: Skin is warm and dry. Neurological:      Mental Status: She is alert and oriented to person, place, and time. Gait: Gait abnormal.   Psychiatric:         Attention and Perception: Attention normal.         Mood and Affect: Mood normal.         Speech: Speech normal.         Behavior: Behavior normal. Behavior is cooperative. Thought Content: Thought content normal.         Assessment/Plan:    Js Cuevas was seen today for fall and extremity weakness. Diagnoses and all orders for this visit:    Recurrent falls  -     AMB POC URINALYSIS DIP STICK AUTO W/O MICRO  -     External Referral to Physical Therapy  -     CT HEAD WO CONTRAST; Future  Check urine to rule out UTI. CT head. Patient's  requesting referral for in-home PT with [facility]. Referral placed. Gait instability  -     External Referral to Physical Therapy  -     CT HEAD WO CONTRAST; Future  As above. At high risk for falls    Rib pain on right side  -     XR RIBS RIGHT (2 VIEWS); Future  X-rays today. Tylenol PRN. Acute pain of right shoulder  -     XR SHOULDER RIGHT (MIN 2 VIEWS); Future    Neck pain  -     XR CERVICAL SPINE (4 OR 5 VIEWS); Future    Acute midline low back pain without sciatica  -     XR LUMBAR SPINE (2-3 VIEWS); Future        Follow-up and Dispositions    Return in about 4 weeks (around 12/1/2022).          On this date, 11/3/22, I have spent 35 minutes reviewing previous notes, test results and face to face with the patient discussing the diagnosis and importance of compliance with the treatment plan as well as documenting on the day of the visit. An electronic signature was used to authenticate this note.   ABIGAIL Gabriel

## 2022-11-04 DIAGNOSIS — R26.81 GAIT INSTABILITY: ICD-10-CM

## 2022-11-04 DIAGNOSIS — Z91.81 AT HIGH RISK FOR FALLS: ICD-10-CM

## 2022-11-04 DIAGNOSIS — R29.6 RECURRENT FALLS: Primary | ICD-10-CM

## 2022-11-18 ENCOUNTER — TELEPHONE (OUTPATIENT)
Dept: INTERNAL MEDICINE CLINIC | Facility: CLINIC | Age: 82
End: 2022-11-18

## 2022-12-02 DIAGNOSIS — F41.9 ANXIETY AND DEPRESSION: ICD-10-CM

## 2022-12-02 DIAGNOSIS — F32.A ANXIETY AND DEPRESSION: ICD-10-CM

## 2022-12-02 RX ORDER — DIAZEPAM 5 MG/1
TABLET ORAL
Qty: 60 TABLET | Refills: 0 | Status: SHIPPED | OUTPATIENT
Start: 2022-12-02 | End: 2023-03-03

## 2022-12-02 NOTE — TELEPHONE ENCOUNTER
Refill Diazepam 5 mg. Scripts checked and last refill date was 11/02/2022 #60. Dr Arianna Jauregui not in office today.

## 2022-12-25 NOTE — PROGRESS NOTES
Nick Lewis (:  1940) is a 79 y.o. female,Established patient, here for evaluation of the following chief complaint(s):  Follow-up (Discuss imaging MRI and xray results) and Fall         ASSESSMENT/PLAN:  1. Unsteadiness on feet  2. Recurrent falls  3. Weakness of both lower extremities  Longstanding issue for which patient has undergone multiple rounds of physical therapy  Suspect likely due to age, debility, underlying lumbar spine disease  Given history of frequent falls, longstanding unsteadiness on the feet, obtain CT scan of the head (previously ordered) to rule out contributing intracranial pathology  Discussed with patient repeating MRI of the lumbar spine to evaluate for disease progression to see if she would be a candidate for additional treatments.  states her pain specialist Dr. Kaci Gilman ordered one earlier this year. Attempt to obtain records  Could consider checking B12 level in the future to rule out deficiency which could be contributing to unsteadiness    4. Lumbar spondylosis  5. Degenerative disc disease, lumbar  6. Anterolisthesis of lumbar spine  MRI lumbar spine on 2019 with multilevel DDD resulting in foraminal encroachment, grade 1 anterolisthesis of L5 on S1 with bilateral spondylolysis  X-ray lumbar spine on 11/3/2022 with moderate levoconvex scoliosis with progressive multilevel spondylosis, now moderate to severe including grade 1-2 anterolisthesis of L5 on S1  Patient currently seeing pain management. Prior trials of injections with temporary/minimal relief  Obtain MRI report from pain management and based on results could consider referral to orthopedic spine specialist to discuss additional treatment options     7. Anxiety and depression  Refill as needed diazepam. Patient has been on this chronically and denies instances of falls occurring around the time she takes the medication. Patient currently takes twice a day with some improvement.   Previously prescribed trials of different types of maintenance anxiolytic/antidepressants however questionable compliance  PDMP reviewed showing diazepam 5 mg tablets, quantity #60, 30-day supply last filled on 12/2/2022    - diazePAM (VALIUM) 5 MG tablet; TAKE 1/2 TO 1 TABLET BY MOUTH TWICE DAILY AS NEEDED FOR ANXIETY. MAX OF TWO TABLETS PER DAY. Dispense: 60 tablet; Refill: 2    8. Dyslipidemia  Refill crestor     - CRESTOR 5 MG tablet; Take 1 tablet by mouth nightly  Dispense: 90 tablet; Refill: 3      Return in about 2 months (around 2/28/2023) for EOV . Subjective   SUBJECTIVE/OBJECTIVE:  Patient is a 76-year-old  female who presents to the office today accompanied by her  to discuss frequent falls and review imaging. This is a longstanding problem for which patient has undergone multiple rounds of physical therapy without significant improvement. Most recent fall occurred 2 days ago where she tripped on a rug and fell backward hitting the back of her head. Developed a hematoma but is decreased in size. No associated loss of consciousness or prolonged downtime. When patient does fall it is frequently due to her legs giving out however patient denies lower extremity pain or knee pain or swelling. Uses a wheelchair when going to doctors appointments and a 3 wheeled walker at home. No double vision or loss of vision and eye exam occurred in the past week or so with borderline glaucoma. Patient does have occasional numbness in her toes but no other lower extremity numbness, paresthesias, saddle anesthesia or incontinence. Does have chronic low back pain currently following with pain management. Previously received injections with only temporary improvement. Back pain seems to be triggered with prolonged standing. Patient also needing refill on her Valium. States that still somewhat helps with her anxiety but not as much as she would like.   Denies feeling significant drowsiness or falling after taking her medication. No suicidal or homicidal ideations. Review of Systems   Eyes:  Negative for visual disturbance. Musculoskeletal:  Positive for back pain. Neurological:  Positive for weakness (Bilateral lower extremities), numbness (Occasionally involving the feet) and headaches (Mild from recent head injury). No saddle anesthesia or bowel/bladder incontinence   Psychiatric/Behavioral:  Negative for self-injury and suicidal ideas. The patient is nervous/anxious. Objective   Physical Exam  Vitals reviewed. Constitutional:       General: She is not in acute distress. Appearance: Normal appearance. She is not ill-appearing, toxic-appearing or diaphoretic. HENT:      Head: Normocephalic and atraumatic. Comments: Mild tenderness to palpation over right occiput without significant palpable hematoma  No raccoon eyes, geiger sign, mastoid process tenderness to palpation     Right Ear: Tympanic membrane, ear canal and external ear normal.      Left Ear: Tympanic membrane, ear canal and external ear normal.   Eyes:      General: No visual field deficit. Right eye: No discharge. Left eye: No discharge. Extraocular Movements: Extraocular movements intact. Comments: Visual fields grossly intact and symmetric bilaterally   Neck:      Comments: Mild right posterior neck discomfort with passive range of motion testing particularly with forward flexion  Passive range of motion preserved in flexion, extension and bilateral rotation without nuchal rigidity  Cardiovascular:      Rate and Rhythm: Normal rate and regular rhythm. Heart sounds: No murmur heard. No friction rub. No gallop. Pulmonary:      Effort: Pulmonary effort is normal. No respiratory distress. Breath sounds: No wheezing, rhonchi or rales. Musculoskeletal:      Right lower leg: No edema. Left lower leg: No edema. Skin:     General: Skin is dry.       Coloration: Skin is not jaundiced. Neurological:      Mental Status: She is alert and oriented to person, place, and time. Cranial Nerves: No cranial nerve deficit, dysarthria or facial asymmetry. Sensory: Sensation is intact. No sensory deficit. Motor: No weakness (Muscle strength 5/5 in all 4 extremities) or pronator drift. Coordination: Finger-Nose-Finger Test normal.   Psychiatric:         Attention and Perception: Attention normal.         Mood and Affect: Affect is blunt. Affect is not tearful. Speech: Speech normal. Speech is not rapid and pressured or slurred. Behavior: Behavior normal. Behavior is not agitated, aggressive or combative. Behavior is cooperative. An electronic signature was used to authenticate this note.     --Adore Caldera, DO

## 2022-12-29 ENCOUNTER — OFFICE VISIT (OUTPATIENT)
Dept: INTERNAL MEDICINE CLINIC | Facility: CLINIC | Age: 82
End: 2022-12-29
Payer: MEDICARE

## 2022-12-29 VITALS
TEMPERATURE: 97.8 F | BODY MASS INDEX: 26.45 KG/M2 | HEIGHT: 66 IN | DIASTOLIC BLOOD PRESSURE: 60 MMHG | HEART RATE: 52 BPM | SYSTOLIC BLOOD PRESSURE: 132 MMHG | OXYGEN SATURATION: 97 %

## 2022-12-29 DIAGNOSIS — F41.9 ANXIETY AND DEPRESSION: ICD-10-CM

## 2022-12-29 DIAGNOSIS — F32.A ANXIETY AND DEPRESSION: ICD-10-CM

## 2022-12-29 DIAGNOSIS — R29.6 RECURRENT FALLS: ICD-10-CM

## 2022-12-29 DIAGNOSIS — M47.816 LUMBAR SPONDYLOSIS: ICD-10-CM

## 2022-12-29 DIAGNOSIS — E78.5 DYSLIPIDEMIA: ICD-10-CM

## 2022-12-29 DIAGNOSIS — R26.81 UNSTEADINESS ON FEET: Primary | ICD-10-CM

## 2022-12-29 DIAGNOSIS — M43.16 ANTEROLISTHESIS OF LUMBAR SPINE: ICD-10-CM

## 2022-12-29 DIAGNOSIS — M51.36 DEGENERATIVE DISC DISEASE, LUMBAR: ICD-10-CM

## 2022-12-29 DIAGNOSIS — R29.898 WEAKNESS OF BOTH LOWER EXTREMITIES: ICD-10-CM

## 2022-12-29 PROCEDURE — 1123F ACP DISCUSS/DSCN MKR DOCD: CPT | Performed by: STUDENT IN AN ORGANIZED HEALTH CARE EDUCATION/TRAINING PROGRAM

## 2022-12-29 PROCEDURE — 99214 OFFICE O/P EST MOD 30 MIN: CPT | Performed by: STUDENT IN AN ORGANIZED HEALTH CARE EDUCATION/TRAINING PROGRAM

## 2022-12-29 RX ORDER — ROSUVASTATIN CALCIUM 5 MG/1
5 TABLET, COATED ORAL DAILY
Qty: 90 TABLET | Refills: 3 | Status: CANCELLED | OUTPATIENT
Start: 2022-12-29

## 2022-12-29 RX ORDER — DIAZEPAM 5 MG/1
TABLET ORAL
Qty: 60 TABLET | Refills: 0 | Status: CANCELLED | OUTPATIENT
Start: 2022-12-29 | End: 2023-03-30

## 2022-12-29 RX ORDER — DIAZEPAM 5 MG/1
TABLET ORAL
Qty: 60 TABLET | Refills: 2 | Status: SHIPPED | OUTPATIENT
Start: 2023-01-01 | End: 2023-03-30

## 2022-12-29 RX ORDER — ROSUVASTATIN CALCIUM 5 MG
5 TABLET ORAL NIGHTLY
Qty: 90 TABLET | Refills: 3 | Status: SHIPPED | OUTPATIENT
Start: 2022-12-29

## 2022-12-29 RX ORDER — METOPROLOL SUCCINATE 25 MG/1
25 TABLET, EXTENDED RELEASE ORAL DAILY
Qty: 90 TABLET | Refills: 3 | Status: CANCELLED | OUTPATIENT
Start: 2022-12-29

## 2022-12-29 ASSESSMENT — ENCOUNTER SYMPTOMS: BACK PAIN: 1

## 2023-01-05 RX ORDER — METOPROLOL SUCCINATE 25 MG/1
12.5 TABLET, EXTENDED RELEASE ORAL DAILY
Qty: 90 TABLET | Refills: 2 | Status: SHIPPED | OUTPATIENT
Start: 2023-01-05

## 2023-01-12 ENCOUNTER — HOSPITAL ENCOUNTER (OUTPATIENT)
Dept: CT IMAGING | Age: 83
Discharge: HOME | End: 2023-01-12
Payer: MEDICARE

## 2023-01-12 DIAGNOSIS — R29.6 RECURRENT FALLS: ICD-10-CM

## 2023-01-12 DIAGNOSIS — R26.81 GAIT INSTABILITY: ICD-10-CM

## 2023-01-12 PROCEDURE — 70450 CT HEAD/BRAIN W/O DYE: CPT

## 2023-01-17 ENCOUNTER — TELEPHONE (OUTPATIENT)
Dept: INTERNAL MEDICINE CLINIC | Facility: CLINIC | Age: 83
End: 2023-01-17

## 2023-01-17 NOTE — TELEPHONE ENCOUNTER
----- Message from Barak Watson sent at 1/17/2023  9:06 AM EST -----  Subject: Results Request    QUESTIONS  Results: CT Scan of Brain; Ordered by:   Date Performed: 2023-01-12  ---------------------------------------------------------------------------  --------------  Marlene Lopez DXYU    [id number]; OK to leave message on voicemail  ---------------------------------------------------------------------------  --------------

## 2023-02-26 PROBLEM — N18.31 STAGE 3A CHRONIC KIDNEY DISEASE (HCC): Status: RESOLVED | Noted: 2021-03-24 | Resolved: 2023-02-26

## 2023-02-26 NOTE — PROGRESS NOTES
Anne Cardenas (:  1940) is a 79 y.o. female,Established patient, here for evaluation of the following chief complaint(s):  Follow-up (2 month Follow Up. ), Dizziness, Memory Loss, and Anxiety         ASSESSMENT/PLAN:  1. Coronary artery disease involving coronary bypass graft of native heart without angina pectoris  History of CABG in  with ZITA to LAD, vein graft to PL branch  Cardiac catheterization in  with atretic ZITA to LAD with 100% occlusion of vein graft, status post stents to native LAD  Mobile telemetry on 2023 with rare PVCs otherwise normal  Previously on low-dose Xarelto however stopped by cardiology after frequent falls  Continue aspirin, rosuvastatin, metoprolol succinate  Follow-up per cardiology    - CBC with Auto Differential; Future  - Comprehensive Metabolic Panel; Future  - Lipid Panel; Future    2. Unsteadiness on feet  Chronic issue, status post multiple rounds of physical therapy  Suspect multifactorial in setting of age, debility and underlying spinal disease  MRI as below  CT scan of the head on 2023 with bilateral white matter low-attenuation present, nonspecific likely chronic small vessel disease but no acute findings  Suspect multifactorial in setting of lumbar spine disease, possible osteoarthritis of the knees, cannot entirely rule out intracranial pathology. Possible neuropathic component, check B12 level  Obtain MRI of the brain as discussed further below  Obtain radiographs of both knees to evaluate for significant degenerative changes predisposing to instability    - Vitamin B12; Future  - MRI BRAIN WO CONTRAST; Future  - XR KNEE LEFT (MIN 4 VIEWS); Future  - XR Knee Right Min 4 VW; Future  [facility] [place]    3. Lumbar spondylosis  4. Degenerative disc disease, lumbar  5.  Anterolisthesis of lumbar spine  MRI lumbar spine from May 2022 with spondylosis and resultant grade 1 anterolisthesis of L5 on S1, disc bulge at this level and severe bilateral neuroforaminal narrowing, multilevel lumbar spondylosis  Prior temporary relief with injection therapy  Follow-up per pain management    6. Anxiety and depression  Currently on as needed diazepam  Monitor closely given increased risk for falls  Given room for improvement increase fluoxetine to 40 mg daily    - FLUoxetine (PROZAC) 40 MG capsule; Take 1 capsule by mouth daily  Dispense: 90 capsule; Refill: 2    7. Dyslipidemia  Lipid panel from 6/27/2022 reviewed with LDL at goal  Continue current dose of rosuvastatin  Recheck lipid panel    - CBC with Auto Differential; Future  - Comprehensive Metabolic Panel; Future  - Lipid Panel; Future    8. Acquired hypothyroidism  Labs from 7/27/2022 shows TSH suppressed at 0.333, free T4 within normal limits  Recheck TSH and free T4 to determine if adjustment in dose of levothyroxine is indicated    - CBC with Auto Differential; Future  - Comprehensive Metabolic Panel; Future  - Lipid Panel; Future  - T4, Free; Future  - TSH; Future    9. Vitamin D deficiency  Vitamin D level low at 21.4 on 7/27/2022  Recheck vitamin D level to determine if dose adjustment is indicated    - Vitamin D 25 Hydroxy; Future    10. Memory problem  MMSE score 28/30 in the office today  CT scan of head on 1/12/2023 with likely chronic small vessel disease but no acute intracranial abnormality  Obtain MRI of the brain to evaluate for prior infarctions to suggest vascular dementia, evaluate for hippocampal atrophy suggestive of Alzheimer's  Check RPR as screening for syphilis  Check B12 and thiamine levels to rule out deficiency  Refer to neurology for further evaluation    - Vitamin B12; Future  - MRI BRAIN WO CONTRAST; Future  - RPR; Future  - Vitamin B1, Whole Blood; Future  - Francisco Mancini DO, Neurology, [place]      Return in about 2 months (around 5/3/2023) for EOV  (60 minutes for all future visits); fasting labs prior .          Subjective SUBJECTIVE/OBJECTIVE:  Patient is an 58-year-old  female who presents to the office today accompanied by her  for follow-up. Patient still with unsteadiness on her feet becoming more more dependent on her wheelchair both when outside the home as well as in the home. Occasionally uses a walker. Still prone to falls characterized by collapse at the level of the knees but without significant knee pain or swelling. Does feel off balance. Does have some baseline tingling in her hands and feet. No facial droop or slurred speech. No chest pain, shortness of breath or palpitations. Patient still struggles with anxiety, taking Valium twice a day. Remains compliant with fluoxetine and does feel as if it helps. Does feel alone some having not been to [employer] in 2 years but still watches [employer] services at home. Does not have many family members remaining and has few close friends. Has not been out to socialize in close to 2 years out of fear of COVID-19. Has not been vaccinated out of concern for allergic reactions which she has had in the past with vaccinations. Does require increasing help with activities of daily living such as showering and transferring. Is able to use silverware without assistance. Generally able to dress without assistance but states her  is nearby in case she does need help. Notes that her handwriting is become smaller and smaller and  states her speech has become softer. Does have issues with short-term memory with  confirming this. No issues recalling names of familiar contacts. Does not drive anymore per the . Patient does not cook or pay bills.  is requesting prescriptions for wheelchair, wheelchair ramp and hospital bed. Review of Systems   Respiratory:  Negative for shortness of breath. Cardiovascular:  Negative for chest pain and palpitations. Musculoskeletal:  Negative for arthralgias and joint swelling. Neurological:  Positive for tremors (Occasional involving both hands but not the head) and weakness (Generalized, particularly in lower extremities). Negative for facial asymmetry, speech difficulty and headaches. Positive for unsteadiness on her feet  Positive for paresthesias in the hands and feet  No stuttering speech   Psychiatric/Behavioral:  Positive for confusion. The patient is nervous/anxious. Objective   Physical Exam  Vitals reviewed. Constitutional:       General: She is not in acute distress. Appearance: She is not ill-appearing, toxic-appearing or diaphoretic. Interventions: She is not intubated. Comments: Elderly, frail appearing  female sitting in a wheelchair in no acute cardiopulmonary distress   HENT:      Head: Normocephalic and atraumatic. Eyes:      General:         Right eye: No discharge. Left eye: No discharge. Extraocular Movements: Extraocular movements intact. Neck:      Vascular: No carotid bruit. Cardiovascular:      Rate and Rhythm: Normal rate and regular rhythm. Heart sounds: No murmur heard. No friction rub. No gallop. Pulmonary:      Effort: No tachypnea, accessory muscle usage, respiratory distress or retractions. She is not intubated. Breath sounds: No wheezing, rhonchi or rales. Abdominal:      General: Bowel sounds are decreased. Palpations: Abdomen is soft. Tenderness: There is no abdominal tenderness. There is no guarding. Skin:     General: Skin is dry. Coloration: Skin is not jaundiced. Neurological:      Mental Status: She is alert. Mental status is at baseline. Cranial Nerves: No cranial nerve deficit. Motor: No pronator drift. Coordination: Finger-Nose-Finger Test normal.      Comments: Muscle strength 4/5 in all 4 extremities   Psychiatric:         Attention and Perception: Attention normal.         Mood and Affect: Mood is not anxious. Affect is blunt.  Affect is not tearful. Speech: Speech normal. Speech is not rapid and pressured or slurred. Behavior: Behavior normal. Behavior is not agitated, aggressive or combative. Behavior is cooperative. Thought Content: Thought content normal.          On this date 3/3/2023 I have spent 55 minutes reviewing previous notes, test results and face to face with the patient discussing the diagnosis and importance of compliance with the treatment plan as well as documenting on the day of the visit. An electronic signature was used to authenticate this note.     --Natalio Beverly, DO

## 2023-03-03 ENCOUNTER — OFFICE VISIT (OUTPATIENT)
Dept: INTERNAL MEDICINE CLINIC | Facility: CLINIC | Age: 83
End: 2023-03-03
Payer: MEDICARE

## 2023-03-03 VITALS
RESPIRATION RATE: 16 BRPM | DIASTOLIC BLOOD PRESSURE: 68 MMHG | SYSTOLIC BLOOD PRESSURE: 114 MMHG | OXYGEN SATURATION: 96 % | TEMPERATURE: 96.9 F | HEART RATE: 59 BPM

## 2023-03-03 DIAGNOSIS — R41.3 MEMORY PROBLEM: ICD-10-CM

## 2023-03-03 DIAGNOSIS — F41.9 ANXIETY AND DEPRESSION: ICD-10-CM

## 2023-03-03 DIAGNOSIS — M51.36 DEGENERATIVE DISC DISEASE, LUMBAR: ICD-10-CM

## 2023-03-03 DIAGNOSIS — F32.A ANXIETY AND DEPRESSION: ICD-10-CM

## 2023-03-03 DIAGNOSIS — M47.816 LUMBAR SPONDYLOSIS: ICD-10-CM

## 2023-03-03 DIAGNOSIS — M43.16 ANTEROLISTHESIS OF LUMBAR SPINE: ICD-10-CM

## 2023-03-03 DIAGNOSIS — E78.5 DYSLIPIDEMIA: ICD-10-CM

## 2023-03-03 DIAGNOSIS — I25.810 CORONARY ARTERY DISEASE INVOLVING CORONARY BYPASS GRAFT OF NATIVE HEART WITHOUT ANGINA PECTORIS: Primary | ICD-10-CM

## 2023-03-03 DIAGNOSIS — E55.9 VITAMIN D DEFICIENCY: ICD-10-CM

## 2023-03-03 DIAGNOSIS — R26.81 UNSTEADINESS ON FEET: ICD-10-CM

## 2023-03-03 DIAGNOSIS — E03.9 ACQUIRED HYPOTHYROIDISM: ICD-10-CM

## 2023-03-03 PROCEDURE — 1036F TOBACCO NON-USER: CPT | Performed by: STUDENT IN AN ORGANIZED HEALTH CARE EDUCATION/TRAINING PROGRAM

## 2023-03-03 PROCEDURE — G8399 PT W/DXA RESULTS DOCUMENT: HCPCS | Performed by: STUDENT IN AN ORGANIZED HEALTH CARE EDUCATION/TRAINING PROGRAM

## 2023-03-03 PROCEDURE — 1123F ACP DISCUSS/DSCN MKR DOCD: CPT | Performed by: STUDENT IN AN ORGANIZED HEALTH CARE EDUCATION/TRAINING PROGRAM

## 2023-03-03 PROCEDURE — G8484 FLU IMMUNIZE NO ADMIN: HCPCS | Performed by: STUDENT IN AN ORGANIZED HEALTH CARE EDUCATION/TRAINING PROGRAM

## 2023-03-03 PROCEDURE — G8427 DOCREV CUR MEDS BY ELIG CLIN: HCPCS | Performed by: STUDENT IN AN ORGANIZED HEALTH CARE EDUCATION/TRAINING PROGRAM

## 2023-03-03 PROCEDURE — 1090F PRES/ABSN URINE INCON ASSESS: CPT | Performed by: STUDENT IN AN ORGANIZED HEALTH CARE EDUCATION/TRAINING PROGRAM

## 2023-03-03 PROCEDURE — G8417 CALC BMI ABV UP PARAM F/U: HCPCS | Performed by: STUDENT IN AN ORGANIZED HEALTH CARE EDUCATION/TRAINING PROGRAM

## 2023-03-03 PROCEDURE — 99215 OFFICE O/P EST HI 40 MIN: CPT | Performed by: STUDENT IN AN ORGANIZED HEALTH CARE EDUCATION/TRAINING PROGRAM

## 2023-03-03 RX ORDER — FLUOXETINE HYDROCHLORIDE 40 MG/1
40 CAPSULE ORAL DAILY
Qty: 90 CAPSULE | Refills: 2 | Status: SHIPPED | OUTPATIENT
Start: 2023-03-03

## 2023-03-03 ASSESSMENT — ENCOUNTER SYMPTOMS: SHORTNESS OF BREATH: 0

## 2023-03-07 DIAGNOSIS — M51.36 DEGENERATIVE DISC DISEASE, LUMBAR: ICD-10-CM

## 2023-03-07 DIAGNOSIS — R26.81 UNSTEADINESS ON FEET: Primary | ICD-10-CM

## 2023-03-07 DIAGNOSIS — R53.81 DEBILITY: ICD-10-CM

## 2023-03-07 DIAGNOSIS — M43.16 ANTEROLISTHESIS OF LUMBAR SPINE: ICD-10-CM

## 2023-03-07 DIAGNOSIS — M47.816 LUMBAR SPONDYLOSIS: ICD-10-CM

## 2023-03-07 RX ORDER — WHEELCHAIR
EACH MISCELLANEOUS
Qty: 1 EACH | Refills: 0 | Status: SHIPPED | OUTPATIENT
Start: 2023-03-07

## 2023-03-07 NOTE — TELEPHONE ENCOUNTER
At appointment last week, patient's  was inquiring into prescription for hospital bed and wheelchair. Printed prescriptions to be sent to DME company. Orders Placed This Encounter    Hospital Bed MISC     Sig: by Does not apply route Dx: R26.81, R53.81, M47.816, M51.36, M43.16     Dispense:  1 each     Refill:  0    Misc.  Devices (WHEELCHAIR) MISC     Sig: Dx: R26.81, R53.81, M47.816, M51.36, M43.16     Dispense:  1 each     Refill:  0

## 2023-03-20 ENCOUNTER — TELEPHONE (OUTPATIENT)
Dept: INTERNAL MEDICINE CLINIC | Facility: CLINIC | Age: 83
End: 2023-03-20

## 2023-03-20 NOTE — TELEPHONE ENCOUNTER
Pt  called requested rx for wheel chair, ramp, and bed that would raise and lower. He reports having bad experience with [facility] [employer]. Requests another medical supply company to use. Rx can be emailed to him if need [email or web address]

## 2023-03-24 NOTE — TELEPHONE ENCOUNTER
Quentin Nunez called about this again today. States that he had called previously and never heard back. Looks like this message was not sent over to Dr. Roger Oquendo.

## 2023-03-27 NOTE — TELEPHONE ENCOUNTER
Called and spoke with patient's daughter, Niru. Advised that I have the prescriptions for her, but that I am unable to email them. She will come by the office to pick them up. Placed at  awaiting pickup.

## 2023-03-30 ENCOUNTER — HOSPITAL ENCOUNTER (OUTPATIENT)
Dept: MRI IMAGING | Age: 83
Discharge: HOME | End: 2023-03-30
Payer: MEDICARE

## 2023-03-30 DIAGNOSIS — R26.81 UNSTEADINESS ON FEET: ICD-10-CM

## 2023-03-30 DIAGNOSIS — R41.3 MEMORY PROBLEM: ICD-10-CM

## 2023-03-30 PROCEDURE — 70551 MRI BRAIN STEM W/O DYE: CPT

## 2023-04-03 DIAGNOSIS — F41.9 ANXIETY AND DEPRESSION: ICD-10-CM

## 2023-04-03 DIAGNOSIS — F32.A ANXIETY AND DEPRESSION: ICD-10-CM

## 2023-04-03 RX ORDER — DIAZEPAM 5 MG/1
TABLET ORAL
Qty: 60 TABLET | Refills: 1 | Status: SHIPPED | OUTPATIENT
Start: 2023-04-03 | End: 2023-06-30

## 2023-04-03 NOTE — TELEPHONE ENCOUNTER
Patient called back stating she is completely out of medication for about 5 days. I informed patient (for future reference) to please call 1 week prior of meds running out. She voiced understanding.

## 2023-04-03 NOTE — TELEPHONE ENCOUNTER
Patient requesting refill on diazepam.  PDMP reviewed showing diazepam 5 mg tablets, quantity #60, 30-day supply last filled on 3/3/2023. New prescription sent to pharmacy on record. However I will need to carefully monitor use in the future as patient should not have run out 5 days ago if she is taking medication as prescribed based on last fill date. Orders Placed This Encounter    diazePAM (VALIUM) 5 MG tablet     Sig: TAKE 1/2 TO 1 TABLET BY MOUTH TWICE DAILY AS NEEDED FOR ANXIETY. MAX OF TWO TABLETS PER DAY. Do not drink alcohol, drive or operate heavy machinery after use of this medication as it may cause drowsiness.      Dispense:  60 tablet     Refill:  1

## 2023-04-17 ENCOUNTER — SCHEDULED TELEPHONE ENCOUNTER (OUTPATIENT)
Dept: INTERNAL MEDICINE CLINIC | Facility: CLINIC | Age: 83
End: 2023-04-17
Payer: MEDICARE

## 2023-04-17 DIAGNOSIS — R29.898 WEAKNESS OF BOTH LOWER EXTREMITIES: ICD-10-CM

## 2023-04-17 DIAGNOSIS — M43.16 ANTEROLISTHESIS OF LUMBAR SPINE: ICD-10-CM

## 2023-04-17 DIAGNOSIS — M47.816 LUMBAR SPONDYLOSIS: Primary | ICD-10-CM

## 2023-04-17 DIAGNOSIS — M51.36 DEGENERATIVE DISC DISEASE, LUMBAR: ICD-10-CM

## 2023-04-17 PROCEDURE — 99442 PR PHYS/QHP TELEPHONE EVALUATION 11-20 MIN: CPT | Performed by: STUDENT IN AN ORGANIZED HEALTH CARE EDUCATION/TRAINING PROGRAM

## 2023-04-17 SDOH — ECONOMIC STABILITY: INCOME INSECURITY: HOW HARD IS IT FOR YOU TO PAY FOR THE VERY BASICS LIKE FOOD, HOUSING, MEDICAL CARE, AND HEATING?: NOT HARD AT ALL

## 2023-04-17 SDOH — ECONOMIC STABILITY: HOUSING INSECURITY
IN THE LAST 12 MONTHS, WAS THERE A TIME WHEN YOU DID NOT HAVE A STEADY PLACE TO SLEEP OR SLEPT IN A SHELTER (INCLUDING NOW)?: NO

## 2023-04-17 SDOH — ECONOMIC STABILITY: FOOD INSECURITY: WITHIN THE PAST 12 MONTHS, THE FOOD YOU BOUGHT JUST DIDN'T LAST AND YOU DIDN'T HAVE MONEY TO GET MORE.: NEVER TRUE

## 2023-04-17 SDOH — ECONOMIC STABILITY: FOOD INSECURITY: WITHIN THE PAST 12 MONTHS, YOU WORRIED THAT YOUR FOOD WOULD RUN OUT BEFORE YOU GOT MONEY TO BUY MORE.: NEVER TRUE

## 2023-04-17 ASSESSMENT — PATIENT HEALTH QUESTIONNAIRE - PHQ9
SUM OF ALL RESPONSES TO PHQ9 QUESTIONS 1 & 2: 0
SUM OF ALL RESPONSES TO PHQ QUESTIONS 1-9: 0
SUM OF ALL RESPONSES TO PHQ QUESTIONS 1-9: 0
1. LITTLE INTEREST OR PLEASURE IN DOING THINGS: 0
SUM OF ALL RESPONSES TO PHQ QUESTIONS 1-9: 0
SUM OF ALL RESPONSES TO PHQ QUESTIONS 1-9: 0
2. FEELING DOWN, DEPRESSED OR HOPELESS: 0

## 2023-04-17 NOTE — PROGRESS NOTES
Chief Complaint   Patient presents with    Extremity Weakness     Pt c/o legs collapsing everytime she stands up. Pt would like to referred to a specialist for this. Pt would like to discuss Brain MRI results that were done 3 weeks ago. The patient is a 79 y.o. female who is contacted at patient request for Telehealth visit by telephone (no video). The patient's available records and electronic chart records are reviewed. The PMH, PSH, medications, allergies, medications, FH, health maintenance and vaccination status are all reviewed and updated as appropriate. Patient is a 78-year-old female who presents via telephone encounter alongside her  to discuss progressively worsening leg weakness. This has been an ongoing and progressive issue for quite some time. Patient does have known degenerative changes in her lumbar spine currently following with pain management, having previously undergone injections without significant improvement. Over time patient has gone from using assistive devices such as walkers but now is primarily wheelchair-bound. Both patient and  state that prior attempts to ambulate would frequently lead to falls with her leg spontaneously giving out on her. Patient does have weakness in both legs but without pain. Denies current back pain but states that if she were up and trying to walk she would probably experience pain. Has intermittent paresthesias in the toes of both feet as well as the fingers of both hands. Still senses when she is able to void. Denies knee pain or swelling or hip pain. Did have a fall last week when her legs gave out on her but no head injury. Assessment and Plan    1. Lumbar spondylosis  2. Degenerative disc disease, lumbar  3.  Anterolisthesis of lumbar spine  MRI lumbar spine from May 2022 with spondylosis and resultant grade 1 anterolisthesis of L5 on S1, disc bulge at this level and severe bilateral neuroforaminal narrowing,

## 2023-04-24 ENCOUNTER — TELEPHONE (OUTPATIENT)
Dept: PHARMACY | Facility: CLINIC | Age: 83
End: 2023-04-24

## 2023-04-24 NOTE — TELEPHONE ENCOUNTER
POPUL[facility]ICAL PH[facility]ACY: ADHERENCE REVIEW  Identified care gap per [employer]: fills at [facility]: Statin adherence    ASSESSMENT  [address] Records claims through 4/10/23 (Prior Year PDC = 100% - PASSED; YTD [place] = FIRST FILL; Potential Fail Date: 6/15/23): Rosuvastatin 5 mg tab last filled on 23 for 90 day supply. Next refill due: 23 max refill due date 23 - likely some surplus but was due ~23    Prescribed si tablet/capsule daily    Per Reconcile Dispense History:  ROSUVASTATIN 5MG TABLETS 2023 90 90 each MD Alona Monge 52 #. .. ROSUVASTATIN 5MG TABLETS 2023 90 90 each MD Alona Monge 52 #. ..   0 refills per hyperlink; last Rx 12/29/22 x 1 yr supply and believe should have active Rx on file at pharmacy    Lab Results   Component Value Date    CHOL 142 2021    TRIG 130 2021    HDL 51 2021    LDLCALC 68 2021     ALT   Date Value Ref Range Status   2022 17 12 - 65 U/L Final     AST   Date Value Ref Range Status   2022 15 15 - 37 U/L Final     The ASCVD Risk score (Batsheva DK, et al., 2019) failed to calculate for the following reasons: The 2019 ASCVD risk score is only valid for ages 36 to 78     PLAN  The following are interventions that have been identified:   Patient overdue refilling rosuvastatin per report but appears has since refilled. Mostly adherent per refill history. No patient out reach planned at this time.     Meli Slade, PharmD, [address] [facility]  Department, toll free: [phone], option 2     For Pharmacy Admin Tracking Only    Program: [address] in place:  No  Gap Closed?: Yes   Time Spent (min): 5

## 2023-04-27 DIAGNOSIS — R30.0 DYSURIA: Primary | ICD-10-CM

## 2023-04-27 DIAGNOSIS — R82.90 ABNORMAL URINALYSIS: ICD-10-CM

## 2023-04-27 RX ORDER — SULFAMETHOXAZOLE AND TRIMETHOPRIM 800; 160 MG/1; MG/1
1 TABLET ORAL 2 TIMES DAILY
Qty: 10 TABLET | Refills: 0 | Status: SHIPPED | OUTPATIENT
Start: 2023-04-27 | End: 2023-05-02

## 2023-04-27 NOTE — TELEPHONE ENCOUNTER
Patient complaining of dysuria. Left urine specimen at nursing visit today which flagged as abnormal but machine unable to read further. Urine culture pending. Unable to use macrobid given CrCl of 56ml/min. Prescription for empiric bactrim sent to pharmacy.      Orders Placed This Encounter    sulfamethoxazole-trimethoprim (BACTRIM DS;SEPTRA DS) 800-160 MG per tablet     Sig: Take 1 tablet by mouth 2 times daily for 5 days     Dispense:  10 tablet     Refill:  0

## 2023-04-30 LAB
BACTERIA SPEC CULT: ABNORMAL
BACTERIA SPEC CULT: ABNORMAL
SERVICE CMNT-IMP: ABNORMAL

## 2023-05-05 ENCOUNTER — TELEPHONE (OUTPATIENT)
Dept: INTERNAL MEDICINE CLINIC | Facility: CLINIC | Age: 83
End: 2023-05-05

## 2023-05-05 DIAGNOSIS — R29.898 WEAKNESS OF BOTH LOWER EXTREMITIES: ICD-10-CM

## 2023-05-05 DIAGNOSIS — R41.3 MEMORY PROBLEM: ICD-10-CM

## 2023-05-05 DIAGNOSIS — R26.81 UNSTEADINESS ON FEET: Primary | ICD-10-CM

## 2023-05-08 NOTE — TELEPHONE ENCOUNTER
Patient requesting referral for specific neurologist.  Referral order placed.     Orders Placed This Encounter    External Referral To Neurology     Referral Priority:   Routine     Referral Type:   Eval and Treat     Referral Reason:   Specialty Services Required     Referred to Provider:   Clarice Holman MD     Requested Specialty:   Neurology     Number of Visits Requested:   1

## 2023-05-09 ENCOUNTER — HOSPITAL ENCOUNTER (OUTPATIENT)
Dept: LAB | Age: 83
Discharge: HOME | End: 2023-05-12

## 2023-05-09 DIAGNOSIS — R26.81 UNSTEADINESS ON FEET: ICD-10-CM

## 2023-05-09 DIAGNOSIS — E55.9 VITAMIN D DEFICIENCY: ICD-10-CM

## 2023-05-09 DIAGNOSIS — E03.9 ACQUIRED HYPOTHYROIDISM: ICD-10-CM

## 2023-05-09 DIAGNOSIS — R41.3 MEMORY PROBLEM: ICD-10-CM

## 2023-05-09 DIAGNOSIS — I25.810 CORONARY ARTERY DISEASE INVOLVING CORONARY BYPASS GRAFT OF NATIVE HEART WITHOUT ANGINA PECTORIS: ICD-10-CM

## 2023-05-09 DIAGNOSIS — E78.5 DYSLIPIDEMIA: ICD-10-CM

## 2023-05-09 LAB
BASOPHILS # BLD: 0.1 K/UL (ref 0–0.2)
BASOPHILS NFR BLD: 1 % (ref 0–2)
DIFFERENTIAL METHOD BLD: NORMAL
EOSINOPHIL # BLD: 0.2 K/UL (ref 0–0.8)
EOSINOPHIL NFR BLD: 2 % (ref 0.5–7.8)
ERYTHROCYTE [DISTWIDTH] IN BLOOD BY AUTOMATED COUNT: 13.7 % (ref 11.9–14.6)
HCT VFR BLD AUTO: 43.6 % (ref 35.8–46.3)
HGB BLD-MCNC: 14.5 G/DL (ref 11.7–15.4)
IMM GRANULOCYTES # BLD AUTO: 0 K/UL (ref 0–0.5)
IMM GRANULOCYTES NFR BLD AUTO: 0 % (ref 0–5)
LYMPHOCYTES # BLD: 2.4 K/UL (ref 0.5–4.6)
LYMPHOCYTES NFR BLD: 30 % (ref 13–44)
MCH RBC QN AUTO: 32.2 PG (ref 26.1–32.9)
MCHC RBC AUTO-ENTMCNC: 33.3 G/DL (ref 31.4–35)
MCV RBC AUTO: 96.7 FL (ref 82–102)
MONOCYTES # BLD: 0.6 K/UL (ref 0.1–1.3)
MONOCYTES NFR BLD: 8 % (ref 4–12)
NEUTS SEG # BLD: 4.6 K/UL (ref 1.7–8.2)
NEUTS SEG NFR BLD: 59 % (ref 43–78)
NRBC # BLD: 0 K/UL (ref 0–0.2)
PLATELET # BLD AUTO: 238 K/UL (ref 150–450)
PMV BLD AUTO: 9.5 FL (ref 9.4–12.3)
RBC # BLD AUTO: 4.51 M/UL (ref 4.05–5.2)
WBC # BLD AUTO: 7.8 K/UL (ref 4.3–11.1)

## 2023-05-10 LAB
25(OH)D3 SERPL-MCNC: 16.3 NG/ML (ref 30–100)
ALBUMIN SERPL-MCNC: 3.8 G/DL (ref 3.2–4.6)
ALBUMIN/GLOB SERPL: 1.1 (ref 0.4–1.6)
ALP SERPL-CCNC: 60 U/L (ref 50–136)
ALT SERPL-CCNC: 22 U/L (ref 12–65)
ANION GAP SERPL CALC-SCNC: 6 MMOL/L (ref 2–11)
AST SERPL-CCNC: 17 U/L (ref 15–37)
BILIRUB SERPL-MCNC: 0.6 MG/DL (ref 0.2–1.1)
BUN SERPL-MCNC: 11 MG/DL (ref 8–23)
CALCIUM SERPL-MCNC: 9.4 MG/DL (ref 8.3–10.4)
CHLORIDE SERPL-SCNC: 105 MMOL/L (ref 101–110)
CHOLEST SERPL-MCNC: 179 MG/DL
CO2 SERPL-SCNC: 25 MMOL/L (ref 21–32)
CREAT SERPL-MCNC: 0.9 MG/DL (ref 0.6–1)
GLOBULIN SER CALC-MCNC: 3.5 G/DL (ref 2.8–4.5)
GLUCOSE SERPL-MCNC: 86 MG/DL (ref 65–100)
HDLC SERPL-MCNC: 51 MG/DL (ref 40–60)
HDLC SERPL: 3.5
LDLC SERPL CALC-MCNC: 97.8 MG/DL
POTASSIUM SERPL-SCNC: 4.1 MMOL/L (ref 3.5–5.1)
PROT SERPL-MCNC: 7.3 G/DL (ref 6.3–8.2)
RPR SER QL: NONREACTIVE
SODIUM SERPL-SCNC: 136 MMOL/L (ref 133–143)
T4 FREE SERPL-MCNC: 1.4 NG/DL (ref 0.78–1.46)
TRIGL SERPL-MCNC: 151 MG/DL (ref 35–150)
TSH, 3RD GENERATION: 0.68 UIU/ML (ref 0.36–3.74)
VIT B12 SERPL-MCNC: 170 PG/ML (ref 193–986)
VLDLC SERPL CALC-MCNC: 30.2 MG/DL (ref 6–23)

## 2023-05-12 LAB — VIT B1 BLD-SCNC: 104.2 NMOL/L (ref 66.5–200)

## 2023-05-16 ENCOUNTER — TELEMEDICINE (OUTPATIENT)
Dept: INTERNAL MEDICINE CLINIC | Facility: CLINIC | Age: 83
End: 2023-05-16
Payer: MEDICARE

## 2023-05-16 DIAGNOSIS — E89.0 POSTOPERATIVE HYPOTHYROIDISM: ICD-10-CM

## 2023-05-16 DIAGNOSIS — R32 URINARY INCONTINENCE, UNSPECIFIED TYPE: ICD-10-CM

## 2023-05-16 DIAGNOSIS — R30.0 DYSURIA: ICD-10-CM

## 2023-05-16 DIAGNOSIS — E78.5 DYSLIPIDEMIA: ICD-10-CM

## 2023-05-16 DIAGNOSIS — E53.8 VITAMIN B12 DEFICIENCY: ICD-10-CM

## 2023-05-16 DIAGNOSIS — E55.9 VITAMIN D DEFICIENCY: ICD-10-CM

## 2023-05-16 DIAGNOSIS — R41.3 MEMORY PROBLEM: Primary | ICD-10-CM

## 2023-05-16 DIAGNOSIS — F32.A ANXIETY AND DEPRESSION: ICD-10-CM

## 2023-05-16 DIAGNOSIS — R53.1 GENERALIZED WEAKNESS: ICD-10-CM

## 2023-05-16 DIAGNOSIS — Z86.73 HISTORY OF CVA (CEREBROVASCULAR ACCIDENT): ICD-10-CM

## 2023-05-16 DIAGNOSIS — F41.9 ANXIETY AND DEPRESSION: ICD-10-CM

## 2023-05-16 PROCEDURE — 99443 PR PHYS/QHP TELEPHONE EVALUATION 21-30 MIN: CPT | Performed by: STUDENT IN AN ORGANIZED HEALTH CARE EDUCATION/TRAINING PROGRAM

## 2023-05-16 RX ORDER — ERGOCALCIFEROL 1.25 MG/1
50000 CAPSULE ORAL WEEKLY
Qty: 12 CAPSULE | Refills: 0 | Status: SHIPPED | OUTPATIENT
Start: 2023-05-16

## 2023-05-16 RX ORDER — MAGNESIUM 200 MG
1 TABLET ORAL DAILY
Qty: 90 TABLET | Refills: 2 | Status: SHIPPED | OUTPATIENT
Start: 2023-05-16

## 2023-05-16 RX ORDER — SULFAMETHOXAZOLE AND TRIMETHOPRIM 800; 160 MG/1; MG/1
1 TABLET ORAL 2 TIMES DAILY
Qty: 6 TABLET | Refills: 0 | Status: SHIPPED | OUTPATIENT
Start: 2023-05-16 | End: 2023-05-19

## 2023-05-16 ASSESSMENT — PATIENT HEALTH QUESTIONNAIRE - PHQ9
4. FEELING TIRED OR HAVING LITTLE ENERGY: 0
SUM OF ALL RESPONSES TO PHQ QUESTIONS 1-9: 0
2. FEELING DOWN, DEPRESSED OR HOPELESS: 0
6. FEELING BAD ABOUT YOURSELF - OR THAT YOU ARE A FAILURE OR HAVE LET YOURSELF OR YOUR FAMILY DOWN: 0
3. TROUBLE FALLING OR STAYING ASLEEP: 0
8. MOVING OR SPEAKING SO SLOWLY THAT OTHER PEOPLE COULD HAVE NOTICED. OR THE OPPOSITE, BEING SO FIGETY OR RESTLESS THAT YOU HAVE BEEN MOVING AROUND A LOT MORE THAN USUAL: 0
1. LITTLE INTEREST OR PLEASURE IN DOING THINGS: 0
SUM OF ALL RESPONSES TO PHQ QUESTIONS 1-9: 0
SUM OF ALL RESPONSES TO PHQ QUESTIONS 1-9: 0
5. POOR APPETITE OR OVEREATING: 0
SUM OF ALL RESPONSES TO PHQ QUESTIONS 1-9: 0
SUM OF ALL RESPONSES TO PHQ9 QUESTIONS 1 & 2: 0
10. IF YOU CHECKED OFF ANY PROBLEMS, HOW DIFFICULT HAVE THESE PROBLEMS MADE IT FOR YOU TO DO YOUR WORK, TAKE CARE OF THINGS AT HOME, OR GET ALONG WITH OTHER PEOPLE: 0
9. THOUGHTS THAT YOU WOULD BE BETTER OFF DEAD, OR OF HURTING YOURSELF: 0
7. TROUBLE CONCENTRATING ON THINGS, SUCH AS READING THE NEWSPAPER OR WATCHING TELEVISION: 0

## 2023-05-16 NOTE — PROGRESS NOTES
Chief Complaint   Patient presents with    Incontinence    Discuss Labs     The patient is a 79 y.o. female who is contacted at patient request for Telehealth visit by telephone (no video). The patient's available records and electronic chart records are reviewed. The PMH, PSH, medications, allergies, medications, FH, health maintenance and vaccination status are all reviewed and updated as appropriate. Patient is an 59-year-old female who presents via telephone encounter today alongside her daughter on speaker phone for lab review as well as medication questions. Patient has been noticing urinary incontinence for the past 5 days but daughter states it has actually been ongoing for months (of note patient is being evaluated for memory problems). Patient states that she does not always know when she has to void but more often what happens is she gets an urgency and is not able to make it to the restroom in time. Has been noticing some dysuria and urgency but no hematuria. Denies leakage of urine with coughing, laughing or sneezing. Daughter states that at a recent gynecology visit she was told that she does not empty her bladder all the way and was given instructions on applying manual pressure to help completely void. Patient does have history of 3 natural births. A few weeks ago did complete a course of Bactrim given positive urine culture which patient believes helped with some of her dysuria. Patient also admits to some intermittent lower quadrant abdominal discomfort described as pressure-like but short-lived. No diarrhea but does have some constipation taking Dulcolax as needed. No melena or hematochezia. Patient has 3 small meals per day. Because of patient's generalized weakness particularly with her lower extremities she is primarily wheelchair-bound. Daughter is wondering if it might be from her diazepam which patient has been on chronically for several years for anxiety.   Currently

## 2023-05-18 ENCOUNTER — TELEPHONE (OUTPATIENT)
Dept: INTERNAL MEDICINE CLINIC | Facility: CLINIC | Age: 83
End: 2023-05-18

## 2023-05-18 DIAGNOSIS — E53.8 VITAMIN B12 DEFICIENCY: Primary | ICD-10-CM

## 2023-05-18 RX ORDER — CYANOCOBALAMIN, ISOPROPYL ALCOHOL 1000MCG/ML
KIT INJECTION
Qty: 11 KIT | Refills: 0 | Status: SHIPPED | OUTPATIENT
Start: 2023-05-18

## 2023-05-18 NOTE — TELEPHONE ENCOUNTER
Pt notified and verbalized understanding. Please send B12 into her local pharmacy. NV scheduled for first injection to teach pt how to self-inject at home for the next 3 doses.

## 2023-05-18 NOTE — TELEPHONE ENCOUNTER
Please let patient know that I misspoke and the dosing is actually 1000 mcg once a day for 1 week and then once a week for 4 weeks. Prescription sent to pharmacy. Orders Placed This Encounter    Cyanocobalamin (B-12 COMPLIANCE INJECTION) 1000 MCG/ML KIT     Sig: Give 1000 mcg under the skin once a day for 1 week. Then give 1000 mcg under the skin once a week for 4 weeks.      Dispense:  11 kit     Refill:  0

## 2023-05-18 NOTE — TELEPHONE ENCOUNTER
----- Message from Syl Salazar,  sent at 5/18/2023  1:54 PM EDT -----  Regarding: B12 injections  Please call patient and let her know that although I sent in a prescription for B12 tablets for her to take, given her memory problems and muscle weakness I would actually recommend a course of B12 injections. It would be 1 shot once a week for 4 weeks. Could either be done herself at home or she can come into the office for nursing visits to have these done. If agreeable please let me know her preference and I will send in a prescription to her pharmacy. Thanks!     Afshan Rivera

## 2023-05-19 ENCOUNTER — TELEPHONE (OUTPATIENT)
Dept: INTERNAL MEDICINE CLINIC | Facility: CLINIC | Age: 83
End: 2023-05-19

## 2023-05-25 ENCOUNTER — NURSE ONLY (OUTPATIENT)
Dept: INTERNAL MEDICINE CLINIC | Facility: CLINIC | Age: 83
End: 2023-05-25
Payer: MEDICARE

## 2023-05-25 DIAGNOSIS — E53.8 VITAMIN B12 DEFICIENCY: Primary | ICD-10-CM

## 2023-05-25 PROCEDURE — 96372 THER/PROPH/DIAG INJ SC/IM: CPT | Performed by: STUDENT IN AN ORGANIZED HEALTH CARE EDUCATION/TRAINING PROGRAM

## 2023-05-25 RX ORDER — CYANOCOBALAMIN 1000 UG/ML
1000 INJECTION, SOLUTION INTRAMUSCULAR; SUBCUTANEOUS ONCE
Status: COMPLETED | OUTPATIENT
Start: 2023-05-25 | End: 2023-05-25

## 2023-05-25 RX ADMIN — CYANOCOBALAMIN 1000 MCG: 1000 INJECTION, SOLUTION INTRAMUSCULAR; SUBCUTANEOUS at 09:27

## 2023-05-26 ENCOUNTER — NURSE ONLY (OUTPATIENT)
Dept: INTERNAL MEDICINE CLINIC | Facility: CLINIC | Age: 83
End: 2023-05-26
Payer: MEDICARE

## 2023-05-26 DIAGNOSIS — E53.8 VITAMIN B12 DEFICIENCY: Primary | ICD-10-CM

## 2023-05-26 PROCEDURE — 96372 THER/PROPH/DIAG INJ SC/IM: CPT | Performed by: STUDENT IN AN ORGANIZED HEALTH CARE EDUCATION/TRAINING PROGRAM

## 2023-05-26 RX ORDER — CYANOCOBALAMIN 1000 UG/ML
1000 INJECTION, SOLUTION INTRAMUSCULAR; SUBCUTANEOUS ONCE
Status: COMPLETED | OUTPATIENT
Start: 2023-05-26 | End: 2023-05-26

## 2023-05-26 RX ADMIN — CYANOCOBALAMIN 1000 MCG: 1000 INJECTION, SOLUTION INTRAMUSCULAR; SUBCUTANEOUS at 10:22

## 2023-05-30 ENCOUNTER — NURSE ONLY (OUTPATIENT)
Dept: INTERNAL MEDICINE CLINIC | Facility: CLINIC | Age: 83
End: 2023-05-30
Payer: MEDICARE

## 2023-05-30 DIAGNOSIS — E53.8 VITAMIN B12 DEFICIENCY: Primary | ICD-10-CM

## 2023-05-30 PROCEDURE — 96372 THER/PROPH/DIAG INJ SC/IM: CPT | Performed by: STUDENT IN AN ORGANIZED HEALTH CARE EDUCATION/TRAINING PROGRAM

## 2023-05-30 RX ORDER — CYANOCOBALAMIN 1000 UG/ML
1000 INJECTION, SOLUTION INTRAMUSCULAR; SUBCUTANEOUS ONCE
Status: COMPLETED | OUTPATIENT
Start: 2023-05-30 | End: 2023-05-30

## 2023-05-30 RX ADMIN — CYANOCOBALAMIN 1000 MCG: 1000 INJECTION, SOLUTION INTRAMUSCULAR; SUBCUTANEOUS at 11:00

## 2023-05-31 ENCOUNTER — NURSE ONLY (OUTPATIENT)
Dept: INTERNAL MEDICINE CLINIC | Facility: CLINIC | Age: 83
End: 2023-05-31
Payer: MEDICARE

## 2023-05-31 DIAGNOSIS — E53.8 VITAMIN B12 DEFICIENCY: Primary | ICD-10-CM

## 2023-05-31 DIAGNOSIS — F32.A ANXIETY AND DEPRESSION: ICD-10-CM

## 2023-05-31 DIAGNOSIS — F41.9 ANXIETY AND DEPRESSION: ICD-10-CM

## 2023-05-31 PROCEDURE — 96372 THER/PROPH/DIAG INJ SC/IM: CPT | Performed by: STUDENT IN AN ORGANIZED HEALTH CARE EDUCATION/TRAINING PROGRAM

## 2023-05-31 RX ORDER — CYANOCOBALAMIN 1000 UG/ML
1000 INJECTION, SOLUTION INTRAMUSCULAR; SUBCUTANEOUS ONCE
Status: COMPLETED | OUTPATIENT
Start: 2023-05-31 | End: 2023-05-31

## 2023-05-31 RX ORDER — DIAZEPAM 5 MG/1
TABLET ORAL
Qty: 60 TABLET | Refills: 1 | Status: SHIPPED | OUTPATIENT
Start: 2023-06-01 | End: 2023-08-27

## 2023-05-31 RX ADMIN — CYANOCOBALAMIN 1000 MCG: 1000 INJECTION, SOLUTION INTRAMUSCULAR; SUBCUTANEOUS at 10:34

## 2023-05-31 NOTE — TELEPHONE ENCOUNTER
Patient requesting refill of diazepam.  PDMP reviewed showing diazepam 5 mg tablets, quantity #60, 30-day supply last filled on 5/2/2023. New prescription sent to pharmacy on record    Orders Placed This Encounter    diazePAM (VALIUM) 5 MG tablet     Sig: TAKE 1/2 TO 1 TABLET BY MOUTH TWICE DAILY AS NEEDED FOR ANXIETY. MAX OF TWO TABLETS PER DAY. Do not drink alcohol, drive or operate heavy machinery after use of this medication as it may cause drowsiness.      Dispense:  60 tablet     Refill:  1

## 2023-05-31 NOTE — TELEPHONE ENCOUNTER
Pt is requesting a refill of diazepam. Please send to [facility] on [address] [facility]. Pt only has enough for tonight and will be out tomorrow.

## 2023-06-01 ENCOUNTER — NURSE ONLY (OUTPATIENT)
Dept: INTERNAL MEDICINE CLINIC | Facility: CLINIC | Age: 83
End: 2023-06-01
Payer: MEDICARE

## 2023-06-01 DIAGNOSIS — E53.8 VITAMIN B12 DEFICIENCY: Primary | ICD-10-CM

## 2023-06-01 PROCEDURE — 96372 THER/PROPH/DIAG INJ SC/IM: CPT | Performed by: STUDENT IN AN ORGANIZED HEALTH CARE EDUCATION/TRAINING PROGRAM

## 2023-06-01 RX ORDER — CYANOCOBALAMIN 1000 UG/ML
1000 INJECTION, SOLUTION INTRAMUSCULAR; SUBCUTANEOUS ONCE
Status: COMPLETED | OUTPATIENT
Start: 2023-06-01 | End: 2023-06-01

## 2023-06-01 RX ADMIN — CYANOCOBALAMIN 1000 MCG: 1000 INJECTION, SOLUTION INTRAMUSCULAR; SUBCUTANEOUS at 10:30

## 2023-06-02 ENCOUNTER — NURSE ONLY (OUTPATIENT)
Dept: INTERNAL MEDICINE CLINIC | Facility: CLINIC | Age: 83
End: 2023-06-02
Payer: MEDICARE

## 2023-06-02 DIAGNOSIS — E53.8 VITAMIN B12 DEFICIENCY: Primary | ICD-10-CM

## 2023-06-02 PROCEDURE — 96372 THER/PROPH/DIAG INJ SC/IM: CPT | Performed by: STUDENT IN AN ORGANIZED HEALTH CARE EDUCATION/TRAINING PROGRAM

## 2023-06-02 RX ORDER — CYANOCOBALAMIN 1000 UG/ML
1000 INJECTION, SOLUTION INTRAMUSCULAR; SUBCUTANEOUS ONCE
Status: COMPLETED | OUTPATIENT
Start: 2023-06-02 | End: 2023-06-02

## 2023-06-02 RX ADMIN — CYANOCOBALAMIN 1000 MCG: 1000 INJECTION, SOLUTION INTRAMUSCULAR; SUBCUTANEOUS at 13:33

## 2023-06-07 ENCOUNTER — OFFICE VISIT (OUTPATIENT)
Dept: UROGYNECOLOGY | Age: 83
End: 2023-06-07
Payer: MEDICARE

## 2023-06-07 VITALS — HEIGHT: 65 IN | BODY MASS INDEX: 22.33 KG/M2 | WEIGHT: 134 LBS

## 2023-06-07 DIAGNOSIS — N81.89 WEAKNESS OF PELVIC FLOOR: ICD-10-CM

## 2023-06-07 DIAGNOSIS — N39.41 URGE URINARY INCONTINENCE: ICD-10-CM

## 2023-06-07 DIAGNOSIS — N39.41 URGE URINARY INCONTINENCE: Primary | ICD-10-CM

## 2023-06-07 DIAGNOSIS — N30.00 ACUTE CYSTITIS WITHOUT HEMATURIA: ICD-10-CM

## 2023-06-07 DIAGNOSIS — N81.4 UTERINE PROLAPSE: ICD-10-CM

## 2023-06-07 LAB
BILIRUBIN, URINE, POC: NEGATIVE
BLOOD URINE, POC: NEGATIVE
GLUCOSE URINE, POC: NEGATIVE
KETONES, URINE, POC: NEGATIVE
LEUKOCYTE ESTERASE, URINE, POC: NORMAL
NITRITE, URINE, POC: POSITIVE
PH, URINE, POC: 7.5 (ref 4.6–8)
PROTEIN,URINE, POC: 100
SPECIFIC GRAVITY, URINE, POC: 1 (ref 1–1.03)
URINALYSIS CLARITY, POC: NORMAL
URINALYSIS COLOR, POC: YELLOW
UROBILINOGEN, POC: NORMAL

## 2023-06-07 PROCEDURE — G8399 PT W/DXA RESULTS DOCUMENT: HCPCS | Performed by: OBSTETRICS & GYNECOLOGY

## 2023-06-07 PROCEDURE — G8420 CALC BMI NORM PARAMETERS: HCPCS | Performed by: OBSTETRICS & GYNECOLOGY

## 2023-06-07 PROCEDURE — 51701 INSERT BLADDER CATHETER: CPT | Performed by: OBSTETRICS & GYNECOLOGY

## 2023-06-07 PROCEDURE — G8427 DOCREV CUR MEDS BY ELIG CLIN: HCPCS | Performed by: OBSTETRICS & GYNECOLOGY

## 2023-06-07 PROCEDURE — 81003 URINALYSIS AUTO W/O SCOPE: CPT | Performed by: OBSTETRICS & GYNECOLOGY

## 2023-06-07 PROCEDURE — 1123F ACP DISCUSS/DSCN MKR DOCD: CPT | Performed by: OBSTETRICS & GYNECOLOGY

## 2023-06-07 PROCEDURE — 1036F TOBACCO NON-USER: CPT | Performed by: OBSTETRICS & GYNECOLOGY

## 2023-06-07 PROCEDURE — 0509F URINE INCON PLAN DOCD: CPT | Performed by: OBSTETRICS & GYNECOLOGY

## 2023-06-07 PROCEDURE — 1090F PRES/ABSN URINE INCON ASSESS: CPT | Performed by: OBSTETRICS & GYNECOLOGY

## 2023-06-07 PROCEDURE — 99205 OFFICE O/P NEW HI 60 MIN: CPT | Performed by: OBSTETRICS & GYNECOLOGY

## 2023-06-07 RX ORDER — ESTRADIOL 0.1 MG/G
1 CREAM VAGINAL
Qty: 42.5 G | Refills: 3 | Status: SHIPPED | OUTPATIENT
Start: 2023-06-07

## 2023-06-07 RX ORDER — ESTRADIOL 0.1 MG/G
1 CREAM VAGINAL DAILY
Qty: 42.5 G | Refills: 3 | Status: SHIPPED | OUTPATIENT
Start: 2023-06-07 | End: 2023-06-07 | Stop reason: ALTCHOICE

## 2023-06-07 NOTE — ASSESSMENT & PLAN NOTE
Her urine today shows signs of infection on testing. I suspect her symptoms are a result of the infection. A culture was sent today, she just finished bactrim. We are going to wait for culture to come back before treatment. We discussed starting vaginal estrogen cream for atrophy. I described the benefits to vaginal health. We briefly discussed well publicized literature on risks of hormone replacement therapy. I described the low systemic absorption of vaginal estrogen. She will start vaginal estrogen. Please use 1g in the vagina 3 nights per week.

## 2023-06-07 NOTE — PROGRESS NOTES
described the anatomy of those muscles involved and their relationship to the end-organs in the pelvis. I described therapy techniques which include a combination of therapeutic exercise, biofeedback, neuromuscular re-education, home programs, and electrical stimulation, as well as therapeutic massage and ultrasound for pain. She is on diazepam twice per day and Prozac. 3. Acute cystitis without hematuria  Assessment & Plan:  Her urine today shows signs of infection on testing. I suspect her symptoms are a result of the infection. A culture was sent today, she just finished bactrim. We are going to wait for culture to come back before treatment. We discussed starting vaginal estrogen cream for atrophy. I described the benefits to vaginal health. We briefly discussed well publicized literature on risks of hormone replacement therapy. I described the low systemic absorption of vaginal estrogen. She will start vaginal estrogen. Please use 1g in the vagina 3 nights per week. 4. Uterine prolapse  Assessment & Plan:           No follow-ups on file. On this date 6/7/2023 I have spent 60 minutes reviewing previous notes, test results and face to face with the patient discussing the diagnosis and importance of compliance with the treatment plan as well as documenting on the day of the visit.     Jessy Salguero, DO

## 2023-06-07 NOTE — ASSESSMENT & PLAN NOTE
She has weak pelvic floor muscles. We discussed the purpose of physical therapy which is to strengthen the pelvic floor muscles and teach proper coordination of those muscles. I described the anatomy of those muscles involved and their relationship to the end-organs in the pelvis. I described therapy techniques which include a combination of therapeutic exercise, biofeedback, neuromuscular re-education, home programs, and electrical stimulation, as well as therapeutic massage and ultrasound for pain. She is on diazepam twice per day and Prozac.

## 2023-06-07 NOTE — ASSESSMENT & PLAN NOTE
We discussed the differential diagnosis of overactive bladder. We discussed the epidemiology, pathogenesis, and etiology of bladder overactivity including the neurophysiologic axis. We also discussed the treatment pathway for OAB. I offered options which include nothing, medications, physical therapy, behavior modification, and neuromodulation. It appears she has a UTI today. UA is positive. We will treat this first and see if her symptoms improve.

## 2023-06-11 LAB
BACTERIA SPEC CULT: ABNORMAL
SERVICE CMNT-IMP: ABNORMAL

## 2023-06-12 NOTE — RESULT ENCOUNTER NOTE
I have called Rupert Nj to discuss lab results. I let her know that her urine culture came back positive for infection. An Antibiotic has been called in based off of her urine culture sensitivities. She was also prescribed a daily prophylactic antibiotic to start after her initial treatment.

## 2023-07-28 DIAGNOSIS — F32.A ANXIETY AND DEPRESSION: ICD-10-CM

## 2023-07-28 DIAGNOSIS — F41.9 ANXIETY AND DEPRESSION: ICD-10-CM

## 2023-07-28 RX ORDER — DIAZEPAM 5 MG/1
TABLET ORAL
Qty: 60 TABLET | Refills: 0 | Status: SHIPPED | OUTPATIENT
Start: 2023-07-28 | End: 2023-10-23

## 2023-07-28 NOTE — TELEPHONE ENCOUNTER
Patient is requesting a refill of the DIAZEPAM 5MG. States she will run out on Sunday. Needs a provider that is in office today to fill since Dr. Laci Thibodeaux is out sick. Next appointment with Dr. Laci Thibodeaux is 8/21/23.      Please send to [address] [place] [address] [place], [place], [place].

## 2023-08-01 NOTE — PROGRESS NOTES
Luis F Renee  : 1940  Primary:  (No info available)  Secondary:  [facility] [address] [facility] [address]  Phone: [phone]  Fax: [phone]    Plan of Care/Certification Expiration Date: 10/31/23      >PT Visit Info:  Plan of Care/Certification Expiration Date: 10/31/23  No Show: 0  Canceled Appointment: 0      Visit Count:  1    OUTPATIENT PHYSICAL THERAPY:OP NOTE TYPE: OP Note Type: Treatment Note 2023       Episode  }Appt Desk             Treatment Diagnosis:  Lack of coordination (muscle incoordination) (R27.8)  Generalized weakness (M62.81)  Pelvic Muscle Wasting (N81.84)  Urge incontinence (N39.41)  Medical/Referring Diagnosis:  Urge incontinence [N39.41]  Referring Physician:  Angely Bauer DO MD Orders:  PT Eval and Treat  Date of Onset:  Onset Date: 23     Allergies:   Atorvastatin; Erythromycin base; Iodides; Rosuvastatin; Simvastatin; Statins; Tetanus toxoid, adsorbed; Aspartame; Azithromycin; Ciprofloxacin; Erythromycin; and Penicillins  Restrictions/Precautions:  No data recordedNo data recorded     Interventions Planned (Treatment may consist of any combination of the following):    No data recorded     >Subjective Comments:  UUI     >Initial:     0/10>Post Session:       0/10  Medications Last Reviewed:  2023  Updated Objective Findings:  See evaluation note from today  Treatment     THERAPEUTIC ACTIVITY: (20 minutes): Functional activity education regarding anatomy, pathology and role of pelvic floor muscle (PFM) function in relation to presenting symptoms and role of pelvic floor therapy in conservative treatment.     TA Educational Topic Notes Date Completed   Pathology/Anatomy/PFM Function  23   Bladder health education Increasing water intake 23   Urinary urge suppression     The knack     Voiding strategies     Nocturia tips     Bowel/Bladder log     Bowel health education Water intake, positioning

## 2023-08-01 NOTE — THERAPY EVALUATION
Care/Certification Expiration Date: 10/31/23     Frequency/Duration: Plan Frequency: 1x/week for pelvic floor therapy     Interventions Planned (Treatment may consist of any combination of the following):    Current Treatment Recommendations: Strengthening; ROM; Functional mobility training; Transfer training; ADL/Self-care training; IADL training; Endurance training; Wheelchair mobility training; Neuromuscular re-education; Pain management; Manual; Home exercise program; Safety education & training; Patient/Caregiver education & training; Equipment evaluation, education, & procurement; Modalities; Positioning; Therapeutic activities       Goals: (Goals have been discussed and agreed upon with patient.)  Short-Term Functional Goals: Time Frame: 6 weeks  Pt will demonstrate I with basic PFM HEP to improve awareness, coordination, and timing of PFM. Patient will demonstrate understanding of and ability to teach back two strategies to reduce constipation and improve toileting to minimize strain on and/or paradoxical movement of the pelvic floor muscles. Patient will demonstrate independence with basic pelvic floor muscle (PFM) home exercises program (HEP) to improve awareness, coordination, and timing of PFM. Patient will demonstrate understanding of and ability to teach back appropriate water intake, bladder irritants, toileting frequency, and positioning for improved self-management of symptoms. Patient will demonstrate appropriate use of the pelvic floor muscle group (quick flicks and/or drops), without compensation, to implement urge suppression appropriately with urgency of urination and decrease the number of pads per day or UI episodes by 1 PPD. Discharge Goals: Time Frame: 12 weeks  Patient will be independent with implementation of urge suppression for 1-3 minutes to allow getting to the bathroom and transferring to the toilet, reducing full loss of bladder frequency by 25% or more.   Patient will

## 2023-08-02 ENCOUNTER — HOSPITAL ENCOUNTER (OUTPATIENT)
Dept: PHYSICAL THERAPY | Age: 83
Setting detail: RECURRING SERIES
Discharge: HOME | End: 2023-08-05
Payer: MEDICARE

## 2023-08-02 PROCEDURE — 97162 PT EVAL MOD COMPLEX 30 MIN: CPT

## 2023-08-02 PROCEDURE — 97530 THERAPEUTIC ACTIVITIES: CPT

## 2023-08-02 ASSESSMENT — PAIN SCALES - GENERAL: PAINLEVEL_OUTOF10: 0

## 2023-08-16 ENCOUNTER — HOSPITAL ENCOUNTER (OUTPATIENT)
Dept: PHYSICAL THERAPY | Age: 83
Setting detail: RECURRING SERIES
Discharge: HOME | End: 2023-08-19
Payer: MEDICARE

## 2023-08-16 PROCEDURE — 97530 THERAPEUTIC ACTIVITIES: CPT

## 2023-08-16 NOTE — PROGRESS NOTES
Reji Vázquez  : 1940  Primary:  (No info available)  Secondary:  [facility] [place] [address]  [address] [facility] [address]  Phone: [phone]  Fax: [phone] Plan Frequency: 1x/week for pelvic floor therapy  Plan of Care/Certification Expiration Date: 10/31/23      >PT Visit Info:  Plan Frequency: 1x/week for pelvic floor therapy  Plan of Care/Certification Expiration Date: 10/31/23  No Show: 0  Canceled Appointment: 0      Visit Count:  2    OUTPATIENT PHYSICAL THERAPY:OP NOTE TYPE: OP Note Type: Treatment Note 2023       Episode  }Appt Desk             Treatment Diagnosis:  Lack of coordination (muscle incoordination) (R27.8)  Generalized weakness (M62.81)  Pelvic Muscle Wasting (N81.84)  Urge incontinence (N39.41)  Medical/Referring Diagnosis:  Urge incontinence [N39.41]  Referring Physician:  Bita Ontiveros DO MD Orders:  PT Eval and Treat  Date of Onset:  Onset Date: 23     Allergies:   Atorvastatin; Erythromycin base; Iodides; Rosuvastatin; Simvastatin; Statins; Tetanus toxoid, adsorbed; Aspartame; Azithromycin; Ciprofloxacin; Erythromycin; and Penicillins  Restrictions/Precautions:  Restrictions/Precautions: Fall Risk  No data recorded     Interventions Planned (Treatment may consist of any combination of the following):    Current Treatment Recommendations: Strengthening; ROM; Functional mobility training; Transfer training; ADL/Self-care training; IADL training; Endurance training; Wheelchair mobility training; Neuromuscular re-education; Pain management; Manual; Home exercise program; Safety education & training; Patient/Caregiver education & training; Equipment evaluation, education, & procurement; Modalities; Positioning; Therapeutic activities     >Subjective Comments:  UUI  Has been working on the breathing. Not drinking as much water as she should. Has a backache today and just doesn't feel up to par.   >Initial:      /10>Post Session:

## 2023-08-18 NOTE — PROGRESS NOTES
Jean Carlos Rojas  : 1940  Primary: [facility] Medicare Complete  Secondary:  Therapy Center at [address]  [phone]   [phone]        Is note documents a phone call I had with the referring provider, Delilah Hinton DO regarding Mrs. Rojo's plan of care. We discussed Ms. Rojo's report of full bladder loss without awareness, caregiver support, my initial assessment, treatment suggestions that have so far been declined, education and lifestyle modifications that have been discussed, and prognosis. There is mutual agreement with the plan of care.     Parisa Henry, PT, DPT

## 2023-08-20 NOTE — PROGRESS NOTES
Summer Rosario (:  1940) is a 79 y.o. female,Established patient, here for evaluation of the following chief complaint(s):  3 Month Follow-Up, Medication Refill, Anxiety, and Depression         ASSESSMENT/PLAN:  1. Memory problem  MMSE score of 28/30 on 3/3/2023  Labs from 2023 with normal TSH, free T4, negative RPR, normal B1  B12 level mildly low at 170 on 2023, vitamin D level low at 16.3. Subsequently replaced, recheck levels  MRI brain without contrast on 3/30/2023 with moderate hippocampal atrophy, small remote lacunar infarct in left posterior basal ganglia, mild global cerebral atrophy, moderate chronic white matter microvascular ischemic changes  In addition to contribution from vitamin D and B12 deficiencies, likely also compounded by underlying mood disorder (?  Pseudodementia), age-related cognitive decline versus underlying dementia. Possible vascular component if dementia given prior ischemic insult  Given memory disturbance, unsteadiness on feet, neuromuscular weakness, questionable tremor and some rigidity on exam new referral to neurology placed for further evaluation for possible systemic neurological process such as Alzheimer's or Parkinson's    - [facility] DO [place], Neurology, [place]    2. Coronary artery disease involving coronary bypass graft of native heart without angina pectoris  History of CABG in  with ZITA to LAD, vein graft to PL branch  Cardiac catheterization in  with atretic ZITA to LAD with 100% occlusion of vein graft, status post stents to native LAD  Mobile telemetry on 2023 with rare PVCs otherwise normal  Previously on low-dose Xarelto however stopped by cardiology after frequent falls  Continue aspirin, rosuvastatin, metoprolol succinate  Follow-up per cardiology    - CBC with Auto Differential; Future  - Comprehensive Metabolic Panel; Future  - Lipid Panel; Future    3.  History of CVA (cerebrovascular accident)  MRI brain without

## 2023-08-21 ENCOUNTER — OFFICE VISIT (OUTPATIENT)
Dept: INTERNAL MEDICINE CLINIC | Facility: CLINIC | Age: 83
End: 2023-08-21
Payer: MEDICARE

## 2023-08-21 VITALS
HEIGHT: 65 IN | WEIGHT: 134 LBS | SYSTOLIC BLOOD PRESSURE: 114 MMHG | BODY MASS INDEX: 22.33 KG/M2 | TEMPERATURE: 97.1 F | RESPIRATION RATE: 16 BRPM | HEART RATE: 64 BPM | OXYGEN SATURATION: 91 % | DIASTOLIC BLOOD PRESSURE: 68 MMHG

## 2023-08-21 DIAGNOSIS — E53.8 VITAMIN B12 DEFICIENCY: ICD-10-CM

## 2023-08-21 DIAGNOSIS — F32.A ANXIETY AND DEPRESSION: ICD-10-CM

## 2023-08-21 DIAGNOSIS — Z86.73 HISTORY OF CVA (CEREBROVASCULAR ACCIDENT): ICD-10-CM

## 2023-08-21 DIAGNOSIS — E55.9 VITAMIN D DEFICIENCY: ICD-10-CM

## 2023-08-21 DIAGNOSIS — E89.0 POSTOPERATIVE HYPOTHYROIDISM: ICD-10-CM

## 2023-08-21 DIAGNOSIS — R41.3 MEMORY PROBLEM: Primary | ICD-10-CM

## 2023-08-21 DIAGNOSIS — I25.810 CORONARY ARTERY DISEASE INVOLVING CORONARY BYPASS GRAFT OF NATIVE HEART WITHOUT ANGINA PECTORIS: ICD-10-CM

## 2023-08-21 DIAGNOSIS — R26.81 UNSTEADINESS ON FEET: ICD-10-CM

## 2023-08-21 DIAGNOSIS — F41.9 ANXIETY AND DEPRESSION: ICD-10-CM

## 2023-08-21 PROCEDURE — 1123F ACP DISCUSS/DSCN MKR DOCD: CPT | Performed by: STUDENT IN AN ORGANIZED HEALTH CARE EDUCATION/TRAINING PROGRAM

## 2023-08-21 PROCEDURE — G8399 PT W/DXA RESULTS DOCUMENT: HCPCS | Performed by: STUDENT IN AN ORGANIZED HEALTH CARE EDUCATION/TRAINING PROGRAM

## 2023-08-21 PROCEDURE — G8427 DOCREV CUR MEDS BY ELIG CLIN: HCPCS | Performed by: STUDENT IN AN ORGANIZED HEALTH CARE EDUCATION/TRAINING PROGRAM

## 2023-08-21 PROCEDURE — 1090F PRES/ABSN URINE INCON ASSESS: CPT | Performed by: STUDENT IN AN ORGANIZED HEALTH CARE EDUCATION/TRAINING PROGRAM

## 2023-08-21 PROCEDURE — 1036F TOBACCO NON-USER: CPT | Performed by: STUDENT IN AN ORGANIZED HEALTH CARE EDUCATION/TRAINING PROGRAM

## 2023-08-21 PROCEDURE — 99215 OFFICE O/P EST HI 40 MIN: CPT | Performed by: STUDENT IN AN ORGANIZED HEALTH CARE EDUCATION/TRAINING PROGRAM

## 2023-08-21 PROCEDURE — G8420 CALC BMI NORM PARAMETERS: HCPCS | Performed by: STUDENT IN AN ORGANIZED HEALTH CARE EDUCATION/TRAINING PROGRAM

## 2023-08-21 RX ORDER — DIAZEPAM 5 MG/1
TABLET ORAL
Qty: 60 TABLET | Refills: 1 | Status: SHIPPED | OUTPATIENT
Start: 2023-08-28 | End: 2023-11-16

## 2023-08-21 ASSESSMENT — ENCOUNTER SYMPTOMS
DIARRHEA: 0
BLOOD IN STOOL: 0
WHEEZING: 1
CONSTIPATION: 0
SINUS PRESSURE: 1
SORE THROAT: 0
COUGH: 0
TROUBLE SWALLOWING: 1
SHORTNESS OF BREATH: 0
ABDOMINAL PAIN: 1
ANAL BLEEDING: 0

## 2023-08-30 ENCOUNTER — HOSPITAL ENCOUNTER (OUTPATIENT)
Dept: PHYSICAL THERAPY | Age: 83
Setting detail: RECURRING SERIES
End: 2023-08-30
Payer: MEDICARE

## 2023-08-30 NOTE — PROGRESS NOTES
Yosef Otoole  : 1940  Primary: [facility] Medicare Complete  Secondary:  Therapy Center at [facility], [address] [place] [address] [address], [address]  [phone]   [phone]        Patient cancelled 2:00 PM appointment and patient's  requested that all further pelvic health appointments be cancelled. Discussion was done at her last appointment on 23 regarding plan of care. Therapist was to be out of town the week of 23, so her next appointment was scheduled for 23. Patient came to the clinic accompanied by her  on 23. When it was communicated that the therapist was out of town and her appointment was on 23, the following week, patient's  became quite upset, stating that the therapist reassured them she would be back on 23. Efforts were made to clarify date, as the patient had come in on 23, and the therapist would indeed be in clinic on 23. However, the patient's  was not understanding and was so upset that he asked for all future appointments to be cancelled. Patient's  called again a few days later to request that all appointments be cancelled. It appears that both the patient and her  were disoriented to time/date. This is the second appointment of three scheduled that have been affected by date or appointment time confusion. Her future appointments were cancelled as requested.     Ross Aquino, PT, DPT

## 2023-09-11 DIAGNOSIS — E03.9 ACQUIRED HYPOTHYROIDISM: ICD-10-CM

## 2023-09-11 RX ORDER — LEVOTHYROXINE SODIUM 0.1 MG/1
100 TABLET ORAL
Qty: 90 TABLET | Refills: 3 | Status: SHIPPED | OUTPATIENT
Start: 2023-09-11

## 2023-09-11 NOTE — TELEPHONE ENCOUNTER
Pt is requesting a refill of levothyroxine. Please send to [facility] on [address]. Pt states they have been out for few days now.